# Patient Record
Sex: MALE | Race: WHITE | NOT HISPANIC OR LATINO
[De-identification: names, ages, dates, MRNs, and addresses within clinical notes are randomized per-mention and may not be internally consistent; named-entity substitution may affect disease eponyms.]

---

## 2017-06-08 ENCOUNTER — RECORDS - HEALTHEAST (OUTPATIENT)
Dept: ADMINISTRATIVE | Facility: OTHER | Age: 82
End: 2017-06-08

## 2017-06-26 ENCOUNTER — RECORDS - HEALTHEAST (OUTPATIENT)
Dept: ADMINISTRATIVE | Facility: OTHER | Age: 82
End: 2017-06-26

## 2017-06-29 ENCOUNTER — RECORDS - HEALTHEAST (OUTPATIENT)
Dept: ADMINISTRATIVE | Facility: OTHER | Age: 82
End: 2017-06-29

## 2017-06-30 ENCOUNTER — AMBULATORY - HEALTHEAST (OUTPATIENT)
Dept: CARDIAC REHAB | Facility: CLINIC | Age: 82
End: 2017-06-30

## 2017-06-30 DIAGNOSIS — Z98.890 S/P MVR (MITRAL VALVE REPAIR): ICD-10-CM

## 2017-07-07 ENCOUNTER — AMBULATORY - HEALTHEAST (OUTPATIENT)
Dept: CARDIOLOGY | Facility: CLINIC | Age: 82
End: 2017-07-07

## 2017-07-10 ENCOUNTER — AMBULATORY - HEALTHEAST (OUTPATIENT)
Dept: CARDIAC REHAB | Facility: CLINIC | Age: 82
End: 2017-07-10

## 2017-07-10 ENCOUNTER — COMMUNICATION - HEALTHEAST (OUTPATIENT)
Dept: TELEHEALTH | Facility: CLINIC | Age: 82
End: 2017-07-10

## 2017-07-10 DIAGNOSIS — Z98.890 S/P MVR (MITRAL VALVE REPAIR): ICD-10-CM

## 2017-07-10 RX ORDER — ACETAMINOPHEN 325 MG/1
325 TABLET ORAL EVERY 6 HOURS PRN
Status: SHIPPED | COMMUNITY
Start: 2017-07-10 | End: 2022-05-03 | Stop reason: ALTCHOICE

## 2017-07-10 RX ORDER — LISINOPRIL 10 MG/1
10 TABLET ORAL DAILY
Status: SHIPPED | COMMUNITY
Start: 2017-07-10

## 2017-07-12 ENCOUNTER — AMBULATORY - HEALTHEAST (OUTPATIENT)
Dept: CARDIAC REHAB | Facility: CLINIC | Age: 82
End: 2017-07-12

## 2017-07-12 DIAGNOSIS — Z98.890 S/P MVR (MITRAL VALVE REPAIR): ICD-10-CM

## 2017-07-13 ENCOUNTER — AMBULATORY - HEALTHEAST (OUTPATIENT)
Dept: CARDIOLOGY | Facility: CLINIC | Age: 82
End: 2017-07-13

## 2017-07-14 ENCOUNTER — OFFICE VISIT - HEALTHEAST (OUTPATIENT)
Dept: CARDIOLOGY | Facility: CLINIC | Age: 82
End: 2017-07-14

## 2017-07-14 ENCOUNTER — AMBULATORY - HEALTHEAST (OUTPATIENT)
Dept: CARDIAC REHAB | Facility: CLINIC | Age: 82
End: 2017-07-14

## 2017-07-14 DIAGNOSIS — Z98.890 S/P MVR (MITRAL VALVE REPAIR): ICD-10-CM

## 2017-07-14 DIAGNOSIS — I48.92 ATRIAL FLUTTER WITH CONTROLLED RESPONSE (H): ICD-10-CM

## 2017-07-14 DIAGNOSIS — Z98.890 STATUS POST MITRAL VALVE REPAIR: ICD-10-CM

## 2017-07-14 LAB
ATRIAL RATE - MUSE: 71 BPM
DIASTOLIC BLOOD PRESSURE - MUSE: NORMAL MMHG
INTERPRETATION ECG - MUSE: NORMAL
P AXIS - MUSE: NORMAL DEGREES
PR INTERVAL - MUSE: 250 MS
QRS DURATION - MUSE: 148 MS
QT - MUSE: 430 MS
QTC - MUSE: 467 MS
R AXIS - MUSE: -5 DEGREES
SYSTOLIC BLOOD PRESSURE - MUSE: NORMAL MMHG
T AXIS - MUSE: 53 DEGREES
VENTRICULAR RATE- MUSE: 71 BPM

## 2017-07-14 ASSESSMENT — MIFFLIN-ST. JEOR: SCORE: 1368.14

## 2017-07-15 ENCOUNTER — COMMUNICATION - HEALTHEAST (OUTPATIENT)
Dept: CARDIOLOGY | Facility: CLINIC | Age: 82
End: 2017-07-15

## 2017-07-15 ENCOUNTER — COMMUNICATION - HEALTHEAST (OUTPATIENT)
Dept: SCHEDULING | Facility: CLINIC | Age: 82
End: 2017-07-15

## 2017-07-17 ENCOUNTER — AMBULATORY - HEALTHEAST (OUTPATIENT)
Dept: CARDIOLOGY | Facility: CLINIC | Age: 82
End: 2017-07-17

## 2017-07-17 ENCOUNTER — HOSPITAL ENCOUNTER (OUTPATIENT)
Dept: LAB | Age: 82
Setting detail: SPECIMEN
Discharge: HOME | End: 2017-07-17

## 2017-07-17 ENCOUNTER — OFFICE VISIT - HEALTHEAST (OUTPATIENT)
Dept: CARDIOLOGY | Facility: CLINIC | Age: 82
End: 2017-07-17

## 2017-07-17 DIAGNOSIS — I48.92 ATRIAL FIBRILLATION AND FLUTTER (H): ICD-10-CM

## 2017-07-17 DIAGNOSIS — I48.92 ATRIAL FLUTTER WITH CONTROLLED RESPONSE (H): ICD-10-CM

## 2017-07-17 DIAGNOSIS — Z79.01 ANTICOAGULATION GOAL OF INR 2 TO 3: ICD-10-CM

## 2017-07-17 DIAGNOSIS — Z98.890 STATUS POST MITRAL VALVE REPAIR: ICD-10-CM

## 2017-07-17 DIAGNOSIS — I48.91 ATRIAL FIBRILLATION AND FLUTTER (H): ICD-10-CM

## 2017-07-17 DIAGNOSIS — Z51.81 ANTICOAGULATION GOAL OF INR 2 TO 3: ICD-10-CM

## 2017-07-17 LAB
ATRIAL RATE - MUSE: 264 BPM
DIASTOLIC BLOOD PRESSURE - MUSE: NORMAL MMHG
INTERPRETATION ECG - MUSE: NORMAL
P AXIS - MUSE: -87 DEGREES
PR INTERVAL - MUSE: NORMAL MS
QRS DURATION - MUSE: 136 MS
QT - MUSE: 410 MS
QTC - MUSE: 559 MS
R AXIS - MUSE: -45 DEGREES
SYSTOLIC BLOOD PRESSURE - MUSE: NORMAL MMHG
T AXIS - MUSE: -50 DEGREES
VENTRICULAR RATE- MUSE: 112 BPM

## 2017-07-17 ASSESSMENT — MIFFLIN-ST. JEOR: SCORE: 1368.14

## 2017-07-18 ENCOUNTER — SURGERY - HEALTHEAST (OUTPATIENT)
Dept: CARDIOLOGY | Facility: CLINIC | Age: 82
End: 2017-07-18

## 2017-07-18 ENCOUNTER — AMBULATORY - HEALTHEAST (OUTPATIENT)
Dept: CARDIOLOGY | Facility: CLINIC | Age: 82
End: 2017-07-18

## 2017-07-18 ENCOUNTER — COMMUNICATION - HEALTHEAST (OUTPATIENT)
Dept: CARDIOLOGY | Facility: CLINIC | Age: 82
End: 2017-07-18

## 2017-07-19 ENCOUNTER — HOSPITAL ENCOUNTER (OUTPATIENT)
Dept: CARDIOLOGY | Facility: CLINIC | Age: 82
Discharge: HOME | End: 2017-07-19
Attending: INTERNAL MEDICINE | Admitting: INTERNAL MEDICINE

## 2017-07-19 ENCOUNTER — COMMUNICATION - HEALTHEAST (OUTPATIENT)
Dept: CARDIOLOGY | Facility: CLINIC | Age: 82
End: 2017-07-19

## 2017-07-19 DIAGNOSIS — I48.91 ATRIAL FIBRILLATION AND FLUTTER (H): ICD-10-CM

## 2017-07-19 DIAGNOSIS — I48.92 ATRIAL FIBRILLATION AND FLUTTER (H): ICD-10-CM

## 2017-07-19 LAB
ATRIAL RATE - MUSE: 53 BPM
DIASTOLIC BLOOD PRESSURE - MUSE: NORMAL MMHG
INTERPRETATION ECG - MUSE: NORMAL
P AXIS - MUSE: 79 DEGREES
PR INTERVAL - MUSE: 184 MS
QRS DURATION - MUSE: 148 MS
QT - MUSE: 534 MS
QTC - MUSE: 501 MS
R AXIS - MUSE: -37 DEGREES
SYSTOLIC BLOOD PRESSURE - MUSE: NORMAL MMHG
T AXIS - MUSE: 31 DEGREES
VENTRICULAR RATE- MUSE: 53 BPM

## 2017-07-19 ASSESSMENT — MIFFLIN-ST. JEOR: SCORE: 1394.79

## 2017-07-21 ENCOUNTER — ANESTHESIA - HEALTHEAST (OUTPATIENT)
Dept: CARDIOLOGY | Facility: CLINIC | Age: 82
End: 2017-07-21

## 2017-07-21 ENCOUNTER — AMBULATORY - HEALTHEAST (OUTPATIENT)
Dept: CARDIAC REHAB | Facility: CLINIC | Age: 82
End: 2017-07-21

## 2017-07-21 DIAGNOSIS — Z98.890 S/P MVR (MITRAL VALVE REPAIR): ICD-10-CM

## 2017-07-24 ENCOUNTER — RECORDS - HEALTHEAST (OUTPATIENT)
Dept: ADMINISTRATIVE | Facility: OTHER | Age: 82
End: 2017-07-24

## 2017-07-24 ENCOUNTER — AMBULATORY - HEALTHEAST (OUTPATIENT)
Dept: CARDIOLOGY | Facility: CLINIC | Age: 82
End: 2017-07-24

## 2017-07-25 ENCOUNTER — OFFICE VISIT - HEALTHEAST (OUTPATIENT)
Dept: CARDIOLOGY | Facility: CLINIC | Age: 82
End: 2017-07-25

## 2017-07-25 DIAGNOSIS — Z98.890 STATUS POST MITRAL VALVE REPAIR: ICD-10-CM

## 2017-07-25 DIAGNOSIS — I50.31 ACUTE DIASTOLIC CHF (CONGESTIVE HEART FAILURE), NYHA CLASS 2 (H): ICD-10-CM

## 2017-07-25 DIAGNOSIS — I48.92 PAROXYSMAL ATRIAL FLUTTER (H): ICD-10-CM

## 2017-07-25 ASSESSMENT — MIFFLIN-ST. JEOR: SCORE: 1361.68

## 2017-07-26 ENCOUNTER — AMBULATORY - HEALTHEAST (OUTPATIENT)
Dept: CARDIAC REHAB | Facility: CLINIC | Age: 82
End: 2017-07-26

## 2017-07-26 DIAGNOSIS — Z98.890 S/P MVR (MITRAL VALVE REPAIR): ICD-10-CM

## 2017-07-28 ENCOUNTER — AMBULATORY - HEALTHEAST (OUTPATIENT)
Dept: CARDIAC REHAB | Facility: CLINIC | Age: 82
End: 2017-07-28

## 2017-07-28 DIAGNOSIS — Z98.890 S/P MVR (MITRAL VALVE REPAIR): ICD-10-CM

## 2017-07-31 ENCOUNTER — AMBULATORY - HEALTHEAST (OUTPATIENT)
Dept: CARDIAC REHAB | Facility: CLINIC | Age: 82
End: 2017-07-31

## 2017-07-31 DIAGNOSIS — Z98.890 S/P MVR (MITRAL VALVE REPAIR): ICD-10-CM

## 2017-08-02 ENCOUNTER — AMBULATORY - HEALTHEAST (OUTPATIENT)
Dept: CARDIAC REHAB | Facility: CLINIC | Age: 82
End: 2017-08-02

## 2017-08-02 DIAGNOSIS — Z98.890 S/P MVR (MITRAL VALVE REPAIR): ICD-10-CM

## 2017-08-04 ENCOUNTER — AMBULATORY - HEALTHEAST (OUTPATIENT)
Dept: CARDIAC REHAB | Facility: CLINIC | Age: 82
End: 2017-08-04

## 2017-08-04 ENCOUNTER — OFFICE VISIT - HEALTHEAST (OUTPATIENT)
Dept: CARDIOLOGY | Facility: CLINIC | Age: 82
End: 2017-08-04

## 2017-08-04 DIAGNOSIS — Z98.890 S/P MVR (MITRAL VALVE REPAIR): ICD-10-CM

## 2017-08-04 DIAGNOSIS — I50.30 HEART FAILURE WITH PRESERVED EJECTION FRACTION (H): ICD-10-CM

## 2017-08-04 DIAGNOSIS — I50.31 ACUTE DIASTOLIC CHF (CONGESTIVE HEART FAILURE), NYHA CLASS 2 (H): ICD-10-CM

## 2017-08-04 ASSESSMENT — MIFFLIN-ST. JEOR: SCORE: 1338.09

## 2017-08-07 ENCOUNTER — AMBULATORY - HEALTHEAST (OUTPATIENT)
Dept: CARDIAC REHAB | Facility: CLINIC | Age: 82
End: 2017-08-07

## 2017-08-07 DIAGNOSIS — Z98.890 S/P MVR (MITRAL VALVE REPAIR): ICD-10-CM

## 2017-08-09 ENCOUNTER — AMBULATORY - HEALTHEAST (OUTPATIENT)
Dept: CARDIAC REHAB | Facility: CLINIC | Age: 82
End: 2017-08-09

## 2017-08-09 DIAGNOSIS — Z98.890 S/P MVR (MITRAL VALVE REPAIR): ICD-10-CM

## 2017-08-11 ENCOUNTER — AMBULATORY - HEALTHEAST (OUTPATIENT)
Dept: CARDIAC REHAB | Facility: CLINIC | Age: 82
End: 2017-08-11

## 2017-08-11 DIAGNOSIS — Z98.890 S/P MVR (MITRAL VALVE REPAIR): ICD-10-CM

## 2017-08-14 ENCOUNTER — AMBULATORY - HEALTHEAST (OUTPATIENT)
Dept: CARDIAC REHAB | Facility: CLINIC | Age: 82
End: 2017-08-14

## 2017-08-14 DIAGNOSIS — Z98.890 S/P MVR (MITRAL VALVE REPAIR): ICD-10-CM

## 2017-08-16 ENCOUNTER — AMBULATORY - HEALTHEAST (OUTPATIENT)
Dept: CARDIAC REHAB | Facility: CLINIC | Age: 82
End: 2017-08-16

## 2017-08-16 DIAGNOSIS — Z98.890 S/P MVR (MITRAL VALVE REPAIR): ICD-10-CM

## 2017-08-18 ENCOUNTER — AMBULATORY - HEALTHEAST (OUTPATIENT)
Dept: CARDIAC REHAB | Facility: CLINIC | Age: 82
End: 2017-08-18

## 2017-08-18 DIAGNOSIS — Z98.890 S/P MVR (MITRAL VALVE REPAIR): ICD-10-CM

## 2017-08-21 ENCOUNTER — AMBULATORY - HEALTHEAST (OUTPATIENT)
Dept: CARDIAC REHAB | Facility: CLINIC | Age: 82
End: 2017-08-21

## 2017-08-21 DIAGNOSIS — Z98.890 S/P MVR (MITRAL VALVE REPAIR): ICD-10-CM

## 2017-08-23 ENCOUNTER — RECORDS - HEALTHEAST (OUTPATIENT)
Dept: ADMINISTRATIVE | Facility: OTHER | Age: 82
End: 2017-08-23

## 2017-08-23 ENCOUNTER — AMBULATORY - HEALTHEAST (OUTPATIENT)
Dept: CARDIOLOGY | Facility: CLINIC | Age: 82
End: 2017-08-23

## 2017-08-23 ENCOUNTER — AMBULATORY - HEALTHEAST (OUTPATIENT)
Dept: CARDIAC REHAB | Facility: CLINIC | Age: 82
End: 2017-08-23

## 2017-08-23 DIAGNOSIS — Z98.890 S/P MVR (MITRAL VALVE REPAIR): ICD-10-CM

## 2017-08-25 ENCOUNTER — OFFICE VISIT - HEALTHEAST (OUTPATIENT)
Dept: CARDIOLOGY | Facility: CLINIC | Age: 82
End: 2017-08-25

## 2017-08-25 ENCOUNTER — AMBULATORY - HEALTHEAST (OUTPATIENT)
Dept: CARDIAC REHAB | Facility: CLINIC | Age: 82
End: 2017-08-25

## 2017-08-25 DIAGNOSIS — Z98.890 S/P MVR (MITRAL VALVE REPAIR): ICD-10-CM

## 2017-08-25 DIAGNOSIS — I50.30 HEART FAILURE WITH PRESERVED EJECTION FRACTION (H): ICD-10-CM

## 2017-08-25 ASSESSMENT — MIFFLIN-ST. JEOR: SCORE: 1344.9

## 2017-08-28 ENCOUNTER — AMBULATORY - HEALTHEAST (OUTPATIENT)
Dept: CARDIAC REHAB | Facility: CLINIC | Age: 82
End: 2017-08-28

## 2017-08-28 DIAGNOSIS — Z98.890 S/P MVR (MITRAL VALVE REPAIR): ICD-10-CM

## 2017-08-30 ENCOUNTER — AMBULATORY - HEALTHEAST (OUTPATIENT)
Dept: CARDIAC REHAB | Facility: CLINIC | Age: 82
End: 2017-08-30

## 2017-08-30 DIAGNOSIS — Z98.890 S/P MVR (MITRAL VALVE REPAIR): ICD-10-CM

## 2017-09-01 ENCOUNTER — OFFICE VISIT - HEALTHEAST (OUTPATIENT)
Dept: CARDIOLOGY | Facility: CLINIC | Age: 82
End: 2017-09-01

## 2017-09-01 ENCOUNTER — AMBULATORY - HEALTHEAST (OUTPATIENT)
Dept: CARDIAC REHAB | Facility: CLINIC | Age: 82
End: 2017-09-01

## 2017-09-01 DIAGNOSIS — Z98.890 S/P MVR (MITRAL VALVE REPAIR): ICD-10-CM

## 2017-09-01 DIAGNOSIS — Z98.890 STATUS POST MITRAL VALVE REPAIR: ICD-10-CM

## 2017-09-01 DIAGNOSIS — I48.3 TYPICAL ATRIAL FLUTTER (H): ICD-10-CM

## 2017-09-01 DIAGNOSIS — I50.30 HEART FAILURE WITH PRESERVED EJECTION FRACTION (H): ICD-10-CM

## 2017-09-01 ASSESSMENT — MIFFLIN-ST. JEOR: SCORE: 1347.17

## 2017-09-06 ENCOUNTER — AMBULATORY - HEALTHEAST (OUTPATIENT)
Dept: CARDIAC REHAB | Facility: CLINIC | Age: 82
End: 2017-09-06

## 2017-09-06 DIAGNOSIS — Z98.890 S/P MVR (MITRAL VALVE REPAIR): ICD-10-CM

## 2017-09-08 ENCOUNTER — AMBULATORY - HEALTHEAST (OUTPATIENT)
Dept: CARDIAC REHAB | Facility: CLINIC | Age: 82
End: 2017-09-08

## 2017-09-08 DIAGNOSIS — Z98.890 S/P MVR (MITRAL VALVE REPAIR): ICD-10-CM

## 2017-09-11 ENCOUNTER — AMBULATORY - HEALTHEAST (OUTPATIENT)
Dept: CARDIAC REHAB | Facility: CLINIC | Age: 82
End: 2017-09-11

## 2017-09-11 DIAGNOSIS — Z98.890 S/P MVR (MITRAL VALVE REPAIR): ICD-10-CM

## 2017-10-17 ENCOUNTER — AMBULATORY - HEALTHEAST (OUTPATIENT)
Dept: CARDIAC REHAB | Facility: CLINIC | Age: 82
End: 2017-10-17

## 2017-10-17 DIAGNOSIS — I25.10 CORONARY ATHEROSCLEROSIS OF NATIVE CORONARY ARTERY: ICD-10-CM

## 2017-10-23 ENCOUNTER — AMBULATORY - HEALTHEAST (OUTPATIENT)
Dept: CARDIAC REHAB | Facility: CLINIC | Age: 82
End: 2017-10-23

## 2017-10-23 DIAGNOSIS — Z98.890 S/P MVR (MITRAL VALVE REPAIR): ICD-10-CM

## 2017-10-23 DIAGNOSIS — I25.10 CORONARY ATHEROSCLEROSIS OF NATIVE CORONARY ARTERY: ICD-10-CM

## 2017-10-31 ENCOUNTER — AMBULATORY - HEALTHEAST (OUTPATIENT)
Dept: CARDIOLOGY | Facility: CLINIC | Age: 82
End: 2017-10-31

## 2017-10-31 ENCOUNTER — RECORDS - HEALTHEAST (OUTPATIENT)
Dept: ADMINISTRATIVE | Facility: OTHER | Age: 82
End: 2017-10-31

## 2017-10-31 ENCOUNTER — OFFICE VISIT - HEALTHEAST (OUTPATIENT)
Dept: CARDIOLOGY | Facility: CLINIC | Age: 82
End: 2017-10-31

## 2017-10-31 DIAGNOSIS — Z98.890 STATUS POST MITRAL VALVE REPAIR: ICD-10-CM

## 2017-10-31 DIAGNOSIS — I48.3 TYPICAL ATRIAL FLUTTER (H): ICD-10-CM

## 2017-10-31 DIAGNOSIS — R01.1 HEART MURMUR: ICD-10-CM

## 2017-10-31 DIAGNOSIS — I50.30 HEART FAILURE WITH PRESERVED EJECTION FRACTION (H): ICD-10-CM

## 2017-10-31 DIAGNOSIS — Z51.81 ANTICOAGULATION GOAL OF INR 2 TO 3: ICD-10-CM

## 2017-10-31 DIAGNOSIS — Z79.01 ANTICOAGULATION GOAL OF INR 2 TO 3: ICD-10-CM

## 2017-10-31 ASSESSMENT — MIFFLIN-ST. JEOR: SCORE: 1326.75

## 2017-11-06 ENCOUNTER — HOSPITAL ENCOUNTER (OUTPATIENT)
Dept: CARDIOLOGY | Facility: CLINIC | Age: 82
Discharge: HOME | End: 2017-11-06
Attending: INTERNAL MEDICINE

## 2017-11-06 LAB
AORTIC ROOT: 3.4 CM
AR DECEL SLOPE: 2130 MM/S2
AR PEAK VELOCITY: 484 CM/S
AV REGURGITANT PEAK GRADIENT: 93.7 MMHG
AV REGURGITATION PRESSURE HALF TIME: 670 MS
BSA FOR ECHO PROCEDURE: 1.75 M2
CV BLOOD PRESSURE: NORMAL MMHG
CV ECHO HEIGHT: 71 IN
CV ECHO WEIGHT: 135 LBS
DOP CALC LVOT AREA: 4.52 CM2
DOP CALC LVOT DIAMETER: 2.4 CM
DOP CALC LVOT PEAK VEL: 85 CM/S
DOP CALC LVOT STROKE VOLUME: 99.5 CM3
DOP CALC MV VTI: 45.9 CM
DOP CALCLVOT PEAK VEL VTI: 22 CM
EJECTION FRACTION: 59 % (ref 55–75)
FRACTIONAL SHORTENING: 24.5 % (ref 28–44)
INTERVENTRICULAR SEPTUM IN END DIASTOLE: 1.4 CM (ref 0.6–1)
IVS/PW RATIO: 1.1
LA AREA 1: 24.6 CM2
LA AREA 2: 24.3 CM2
LEFT ATRIUM LENGTH: 5.75 CM
LEFT ATRIUM SIZE: 3.8 CM
LEFT ATRIUM TO AORTIC ROOT RATIO: 1.12 NO UNITS
LEFT ATRIUM VOLUME INDEX: 50.5 ML/M2
LEFT ATRIUM VOLUME: 88.4 CM3
LEFT VENTRICLE CARDIAC INDEX: 2.6 L/MIN/M2
LEFT VENTRICLE CARDIAC OUTPUT: 4.5 L/MIN
LEFT VENTRICLE DIASTOLIC VOLUME INDEX: 78.9 CM3/M2 (ref 34–74)
LEFT VENTRICLE DIASTOLIC VOLUME: 138 CM3 (ref 62–150)
LEFT VENTRICLE HEART RATE: 45 BPM
LEFT VENTRICLE MASS INDEX: 153.1 G/M2
LEFT VENTRICLE SYSTOLIC VOLUME INDEX: 32.6 CM3/M2 (ref 11–31)
LEFT VENTRICLE SYSTOLIC VOLUME: 57 CM3 (ref 21–61)
LEFT VENTRICULAR INTERNAL DIMENSION IN DIASTOLE: 4.9 CM (ref 4.2–5.8)
LEFT VENTRICULAR INTERNAL DIMENSION IN SYSTOLE: 3.7 CM (ref 2.5–4)
LEFT VENTRICULAR MASS: 267.9 G
LEFT VENTRICULAR OUTFLOW TRACT MEAN GRADIENT: 2 MMHG
LEFT VENTRICULAR OUTFLOW TRACT MEAN VELOCITY: 59.6 CM/S
LEFT VENTRICULAR OUTFLOW TRACT PEAK GRADIENT: 3 MMHG
LEFT VENTRICULAR POSTERIOR WALL IN END DIASTOLE: 1.3 CM (ref 0.6–1)
LV STROKE VOLUME INDEX: 56.8 ML/M2
MITRAL REGURGITANT VELOCITY TIME INTEGRAL: 218 CM
MITRAL VALVE DECELERATION SLOPE: 3100 MM/S2
MITRAL VALVE E/A RATIO: 1.2
MITRAL VALVE MEAN INFLOW VELOCITY: 54.3 CM/S
MITRAL VALVE PEAK VELOCITY: 98.9 CM/S
MITRAL VALVE PRESSURE HALF-TIME: 100 MS
MR FLOW: 28 CM3
MR MEAN GRADIENT: 66 MMHG
MR MEAN VELOCITY: 378 CM/S
MR PEAK GRADIENT: 114.1 MMHG
MR PISA EROA: 0.1 CM2
MR PISA RADIUS: 0.6 CM
MR PISA VN NYQUIST: 30.8 CM/S
MV AREA VTI: 2.17 CM2
MV AVERAGE E/E' RATIO: 11.7 CM/S
MV DECELERATION TIME: 141 MS
MV E'TISSUE VEL-LAT: 9.07 CM/S
MV E'TISSUE VEL-MED: 4.29 CM/S
MV LATERAL E/E' RATIO: 8.6
MV MEAN GRADIENT: 2 MMHG
MV MEDIAL E/E' RATIO: 18.2
MV PEAK A VELOCITY: 64.2 CM/S
MV PEAK E VELOCITY: 78 CM/S
MV PEAK GRADIENT: 3.9 MMHG
MV REGURGITANT VOLUME: 28.4 CC
MV VALVE AREA BY CONTINUITY EQUATION: 2.2 CM2
MV VALVE AREA PRESSURE 1/2 METHOD: 2.2 CM2
NUC REST DIASTOLIC VOLUME INDEX: 2160 LBS
NUC REST SYSTOLIC VOLUME INDEX: 71 IN
PISA MR PEAK VEL: 534 CM/S
RIGHT VENTRICULAR INTERNAL DIMENSION IN DYSTOLE: 2.9 CM
TRICUSPID REGURGITATION PEAK PRESSURE GRADIENT: 27 MMHG
TRICUSPID VALVE ANULAR PLANE SYSTOLIC EXCURSION: 1.7 CM
TRICUSPID VALVE PEAK REGURGITANT VELOCITY: 260 CM/S

## 2017-11-06 ASSESSMENT — MIFFLIN-ST. JEOR: SCORE: 1324.49

## 2017-11-07 ENCOUNTER — AMBULATORY - HEALTHEAST (OUTPATIENT)
Dept: CARDIOLOGY | Facility: CLINIC | Age: 82
End: 2017-11-07

## 2017-11-09 ENCOUNTER — COMMUNICATION - HEALTHEAST (OUTPATIENT)
Dept: CARDIOLOGY | Facility: CLINIC | Age: 82
End: 2017-11-09

## 2017-11-09 DIAGNOSIS — I48.3 TYPICAL ATRIAL FLUTTER (H): ICD-10-CM

## 2017-11-09 DIAGNOSIS — R01.1 HEART MURMUR: ICD-10-CM

## 2017-11-09 DIAGNOSIS — I50.9 HEART FAILURE (H): ICD-10-CM

## 2017-11-20 ENCOUNTER — HOSPITAL ENCOUNTER (OUTPATIENT)
Dept: CARDIOLOGY | Facility: CLINIC | Age: 82
Discharge: HOME | End: 2017-11-20
Attending: INTERNAL MEDICINE

## 2017-11-20 DIAGNOSIS — R01.1 HEART MURMUR: ICD-10-CM

## 2017-11-20 DIAGNOSIS — Z98.890 STATUS POST MITRAL VALVE REPAIR: ICD-10-CM

## 2017-11-20 DIAGNOSIS — Z79.01 ANTICOAGULATION GOAL OF INR 2 TO 3: ICD-10-CM

## 2017-11-20 DIAGNOSIS — I48.3 TYPICAL ATRIAL FLUTTER (H): ICD-10-CM

## 2017-11-20 DIAGNOSIS — I50.30 HEART FAILURE WITH PRESERVED EJECTION FRACTION (H): ICD-10-CM

## 2017-11-20 DIAGNOSIS — Z51.81 ANTICOAGULATION GOAL OF INR 2 TO 3: ICD-10-CM

## 2017-11-22 ENCOUNTER — AMBULATORY - HEALTHEAST (OUTPATIENT)
Dept: CARDIOLOGY | Facility: CLINIC | Age: 82
End: 2017-11-22

## 2017-12-19 ENCOUNTER — COMMUNICATION - HEALTHEAST (OUTPATIENT)
Dept: ADMINISTRATIVE | Facility: CLINIC | Age: 82
End: 2017-12-19

## 2018-02-26 ENCOUNTER — AMBULATORY - HEALTHEAST (OUTPATIENT)
Dept: CARDIOLOGY | Facility: CLINIC | Age: 83
End: 2018-02-26

## 2018-02-26 ENCOUNTER — RECORDS - HEALTHEAST (OUTPATIENT)
Dept: ADMINISTRATIVE | Facility: OTHER | Age: 83
End: 2018-02-26

## 2018-03-05 ENCOUNTER — OFFICE VISIT - HEALTHEAST (OUTPATIENT)
Dept: CARDIOLOGY | Facility: CLINIC | Age: 83
End: 2018-03-05

## 2018-03-05 DIAGNOSIS — Z51.81 ANTICOAGULATION GOAL OF INR 2 TO 3: ICD-10-CM

## 2018-03-05 DIAGNOSIS — Z98.890 STATUS POST MITRAL VALVE REPAIR: ICD-10-CM

## 2018-03-05 DIAGNOSIS — I48.3 TYPICAL ATRIAL FLUTTER (H): ICD-10-CM

## 2018-03-05 DIAGNOSIS — Z79.01 ANTICOAGULATION GOAL OF INR 2 TO 3: ICD-10-CM

## 2018-03-05 ASSESSMENT — MIFFLIN-ST. JEOR: SCORE: 1347.17

## 2018-05-21 ENCOUNTER — RECORDS - HEALTHEAST (OUTPATIENT)
Dept: LAB | Facility: CLINIC | Age: 83
End: 2018-05-21

## 2018-05-21 LAB
ANION GAP SERPL CALCULATED.3IONS-SCNC: 8 MMOL/L (ref 5–18)
BUN SERPL-MCNC: 20 MG/DL (ref 8–28)
CALCIUM SERPL-MCNC: 9.7 MG/DL (ref 8.5–10.5)
CHLORIDE BLD-SCNC: 111 MMOL/L (ref 98–107)
CO2 SERPL-SCNC: 21 MMOL/L (ref 22–31)
CREAT SERPL-MCNC: 0.96 MG/DL (ref 0.7–1.3)
FERRITIN SERPL-MCNC: 20 NG/ML (ref 27–300)
FOLATE SERPL-MCNC: 13 NG/ML
GFR SERPL CREATININE-BSD FRML MDRD: >60 ML/MIN/1.73M2
GLUCOSE BLD-MCNC: 69 MG/DL (ref 70–125)
IRON SERPL-MCNC: 47 UG/DL (ref 42–175)
POTASSIUM BLD-SCNC: 4.7 MMOL/L (ref 3.5–5)
SODIUM SERPL-SCNC: 140 MMOL/L (ref 136–145)
VIT B12 SERPL-MCNC: 252 PG/ML (ref 213–816)

## 2018-05-22 LAB
BASOPHILS # BLD AUTO: 0.1 THOU/UL (ref 0–0.2)
BASOPHILS NFR BLD AUTO: 1 % (ref 0–2)
EOSINOPHIL # BLD AUTO: 0.1 THOU/UL (ref 0–0.4)
EOSINOPHIL NFR BLD AUTO: 1 % (ref 0–6)
ERYTHROCYTE [DISTWIDTH] IN BLOOD BY AUTOMATED COUNT: 15.6 % (ref 11–14.5)
HCT VFR BLD AUTO: 28.5 % (ref 40–54)
HGB BLD-MCNC: 8.3 G/DL (ref 14–18)
LYMPHOCYTES # BLD AUTO: 0.8 THOU/UL (ref 0.8–4.4)
LYMPHOCYTES NFR BLD AUTO: 18 % (ref 20–40)
MCH RBC QN AUTO: 27.7 PG (ref 27–34)
MCHC RBC AUTO-ENTMCNC: 29.1 G/DL (ref 32–36)
MCV RBC AUTO: 95 FL (ref 80–100)
MONOCYTES # BLD AUTO: 0.6 THOU/UL (ref 0–0.9)
MONOCYTES NFR BLD AUTO: 13 % (ref 2–10)
NEUTROPHILS # BLD AUTO: 2.8 THOU/UL (ref 2–7.7)
NEUTROPHILS NFR BLD AUTO: 66 % (ref 50–70)
OVALOCYTES: ABNORMAL
PATH REPORT.MICROSCOPIC SPEC OTHER STN: ABNORMAL
PLAT MORPH BLD: NORMAL
PLATELET # BLD AUTO: 184 THOU/UL (ref 140–440)
PMV BLD AUTO: 12.5 FL (ref 8.5–12.5)
RBC # BLD AUTO: 3 MILL/UL (ref 4.4–6.2)
SCHISTOCYTES: ABNORMAL
WBC: 4.3 THOU/UL (ref 4–11)

## 2018-05-23 LAB
LAB AP CHARGES (HE HISTORICAL CONVERSION): NORMAL
PATH REPORT.COMMENTS IMP SPEC: NORMAL
PATH REPORT.COMMENTS IMP SPEC: NORMAL
PATH REPORT.FINAL DX SPEC: NORMAL
PATH REPORT.MICROSCOPIC SPEC OTHER STN: NORMAL
PATH REPORT.RELEVANT HX SPEC: NORMAL

## 2018-06-25 ENCOUNTER — RECORDS - HEALTHEAST (OUTPATIENT)
Dept: LAB | Facility: CLINIC | Age: 83
End: 2018-06-25

## 2018-06-25 LAB — FERRITIN SERPL-MCNC: 21 NG/ML (ref 27–300)

## 2018-06-26 LAB
BASOPHILS # BLD AUTO: 0.1 THOU/UL (ref 0–0.2)
BASOPHILS NFR BLD AUTO: 1 % (ref 0–2)
EOSINOPHIL # BLD AUTO: 0.1 THOU/UL (ref 0–0.4)
EOSINOPHIL NFR BLD AUTO: 1 % (ref 0–6)
ERYTHROCYTE [DISTWIDTH] IN BLOOD BY AUTOMATED COUNT: 17.2 % (ref 11–14.5)
HCT VFR BLD AUTO: 31.2 % (ref 40–54)
HGB BLD-MCNC: 9.9 G/DL (ref 14–18)
LAB AP CHARGES (HE HISTORICAL CONVERSION): NORMAL
LYMPHOCYTES # BLD AUTO: 0.8 THOU/UL (ref 0.8–4.4)
LYMPHOCYTES NFR BLD AUTO: 17 % (ref 20–40)
MCH RBC QN AUTO: 29 PG (ref 27–34)
MCHC RBC AUTO-ENTMCNC: 31.7 G/DL (ref 32–36)
MCV RBC AUTO: 92 FL (ref 80–100)
MONOCYTES # BLD AUTO: 0.5 THOU/UL (ref 0–0.9)
MONOCYTES NFR BLD AUTO: 11 % (ref 2–10)
NEUTROPHILS # BLD AUTO: 3.2 THOU/UL (ref 2–7.7)
NEUTROPHILS NFR BLD AUTO: 69 % (ref 50–70)
PATH REPORT.COMMENTS IMP SPEC: NORMAL
PATH REPORT.COMMENTS IMP SPEC: NORMAL
PATH REPORT.FINAL DX SPEC: NORMAL
PATH REPORT.MICROSCOPIC SPEC OTHER STN: ABNORMAL
PATH REPORT.MICROSCOPIC SPEC OTHER STN: NORMAL
PATH REPORT.RELEVANT HX SPEC: NORMAL
PLATELET # BLD AUTO: 151 THOU/UL (ref 140–440)
PMV BLD AUTO: 12.4 FL (ref 8.5–12.5)
RBC # BLD AUTO: 3.41 MILL/UL (ref 4.4–6.2)
WBC: 4.7 THOU/UL (ref 4–11)

## 2018-10-04 ENCOUNTER — RECORDS - HEALTHEAST (OUTPATIENT)
Dept: LAB | Facility: CLINIC | Age: 83
End: 2018-10-04

## 2018-10-04 LAB — FERRITIN SERPL-MCNC: 38 NG/ML (ref 27–300)

## 2018-10-25 ENCOUNTER — COMMUNICATION - HEALTHEAST (OUTPATIENT)
Dept: ADMINISTRATIVE | Facility: CLINIC | Age: 83
End: 2018-10-25

## 2018-10-29 ENCOUNTER — AMBULATORY - HEALTHEAST (OUTPATIENT)
Dept: CARDIAC REHAB | Facility: CLINIC | Age: 83
End: 2018-10-29

## 2018-10-29 DIAGNOSIS — Z98.890 S/P MVR (MITRAL VALVE REPAIR): ICD-10-CM

## 2018-11-02 ENCOUNTER — AMBULATORY - HEALTHEAST (OUTPATIENT)
Dept: CARDIAC REHAB | Facility: CLINIC | Age: 83
End: 2018-11-02

## 2018-11-02 DIAGNOSIS — Z98.890 S/P MVR (MITRAL VALVE REPAIR): ICD-10-CM

## 2018-11-28 ENCOUNTER — AMBULATORY - HEALTHEAST (OUTPATIENT)
Dept: CARDIAC REHAB | Facility: CLINIC | Age: 83
End: 2018-11-28

## 2018-11-28 DIAGNOSIS — Z98.890 S/P MVR (MITRAL VALVE REPAIR): ICD-10-CM

## 2019-01-08 ENCOUNTER — HOSPITAL ENCOUNTER (OUTPATIENT)
Dept: CARDIOLOGY | Facility: CLINIC | Age: 84
Discharge: HOME | End: 2019-01-08
Attending: INTERNAL MEDICINE

## 2019-01-08 DIAGNOSIS — Z98.890 STATUS POST MITRAL VALVE REPAIR: ICD-10-CM

## 2019-01-08 DIAGNOSIS — Z51.81 ANTICOAGULATION GOAL OF INR 2 TO 3: ICD-10-CM

## 2019-01-08 DIAGNOSIS — I48.3 TYPICAL ATRIAL FLUTTER (H): ICD-10-CM

## 2019-01-08 DIAGNOSIS — Z79.01 ANTICOAGULATION GOAL OF INR 2 TO 3: ICD-10-CM

## 2019-01-08 LAB
AORTIC ROOT: 4 CM
AORTIC VALVE MEAN VELOCITY: 80.2 CM/S
AR DECEL SLOPE: 2430 MM/S2
AR PEAK VELOCITY: 459 CM/S
ASCENDING AORTA: 3.7 CM
AV DIMENSIONLESS INDEX VTI: 0.8
AV MEAN GRADIENT: 3 MMHG
AV PEAK GRADIENT: 6.1 MMHG
AV REGURGITANT PEAK GRADIENT: 84.3 MMHG
AV REGURGITATION PRESSURE HALF TIME: 403 MS
AV VALVE AREA: 2.5 CM2
AV VELOCITY RATIO: 0.7
BSA FOR ECHO PROCEDURE: 1.78 M2
CV BLOOD PRESSURE: ABNORMAL MMHG
CV ECHO HEIGHT: 71 IN
CV ECHO WEIGHT: 140 LBS
DOP CALC AO PEAK VEL: 123 CM/S
DOP CALC AO VTI: 26.9 CM
DOP CALC LVOT AREA: 3.14 CM2
DOP CALC LVOT DIAMETER: 2 CM
DOP CALC LVOT PEAK VEL: 89.8 CM/S
DOP CALC LVOT STROKE VOLUME: 66.6 CM3
DOP CALC MV VTI: 35.3 CM
DOP CALCLVOT PEAK VEL VTI: 21.2 CM
EJECTION FRACTION: 58 % (ref 55–75)
FRACTIONAL SHORTENING: 18.8 % (ref 28–44)
INTERVENTRICULAR SEPTUM IN END DIASTOLE: 1.3 CM (ref 0.6–1)
IVS/PW RATIO: 1.3
LA AREA 1: 28.8 CM2
LA AREA 2: 30.4 CM2
LEFT ATRIUM LENGTH: 6.39 CM
LEFT ATRIUM SIZE: 3.8 CM
LEFT ATRIUM TO AORTIC ROOT RATIO: 0.95 NO UNITS
LEFT ATRIUM VOLUME INDEX: 65.4 ML/M2
LEFT ATRIUM VOLUME: 116.5 ML
LEFT VENTRICLE CARDIAC INDEX: 2.2 L/MIN/M2
LEFT VENTRICLE CARDIAC OUTPUT: 4 L/MIN
LEFT VENTRICLE DIASTOLIC VOLUME INDEX: 80.9 CM3/M2 (ref 34–74)
LEFT VENTRICLE DIASTOLIC VOLUME: 144 CM3 (ref 62–150)
LEFT VENTRICLE HEART RATE: 60 BPM
LEFT VENTRICLE MASS INDEX: 115.9 G/M2
LEFT VENTRICLE SYSTOLIC VOLUME INDEX: 34.3 CM3/M2 (ref 11–31)
LEFT VENTRICLE SYSTOLIC VOLUME: 61 CM3 (ref 21–61)
LEFT VENTRICULAR INTERNAL DIMENSION IN DIASTOLE: 4.8 CM (ref 4.2–5.8)
LEFT VENTRICULAR INTERNAL DIMENSION IN SYSTOLE: 3.9 CM (ref 2.5–4)
LEFT VENTRICULAR MASS: 206.4 G
LEFT VENTRICULAR OUTFLOW TRACT MEAN GRADIENT: 2 MMHG
LEFT VENTRICULAR OUTFLOW TRACT MEAN VELOCITY: 59.1 CM/S
LEFT VENTRICULAR OUTFLOW TRACT PEAK GRADIENT: 3 MMHG
LEFT VENTRICULAR POSTERIOR WALL IN END DIASTOLE: 1 CM (ref 0.6–1)
LV STROKE VOLUME INDEX: 37.4 ML/M2
MITRAL REGURGITANT VELOCITY TIME INTEGRAL: 228 CM
MITRAL VALVE DECELERATION SLOPE: 4950 MM/S2
MITRAL VALVE E/A RATIO: 1.9
MITRAL VALVE MEAN INFLOW VELOCITY: 61.9 CM/S
MITRAL VALVE PEAK VELOCITY: 116 CM/S
MITRAL VALVE PRESSURE HALF-TIME: 70 MS
MR FLOW: 36 CM3
MR MEAN GRADIENT: 75 MMHG
MR MEAN VELOCITY: 402 CM/S
MR PEAK GRADIENT: 123.2 MMHG
MR PISA EROA: 0.2 CM2
MR PISA RADIUS: 0.7 CM
MR PISA VN NYQUIST: 29.7 CM/S
MV AREA VTI: 1.89 CM2
MV AVERAGE E/E' RATIO: 10.6 CM/S
MV DECELERATION TIME: 193 MS
MV E'TISSUE VEL-LAT: 15.5 CM/S
MV E'TISSUE VEL-MED: 5.17 CM/S
MV LATERAL E/E' RATIO: 7.1
MV MEAN GRADIENT: 2 MMHG
MV MEDIAL E/E' RATIO: 21.3
MV PEAK A VELOCITY: 59.2 CM/S
MV PEAK E VELOCITY: 110 CM/S
MV PEAK GRADIENT: 5.4 MMHG
MV REGURGITANT VOLUME: 37.5 CC
MV VALVE AREA BY CONTINUITY EQUATION: 1.9 CM2
MV VALVE AREA PRESSURE 1/2 METHOD: 3.1 CM2
NUC REST DIASTOLIC VOLUME INDEX: 2240 LBS
NUC REST SYSTOLIC VOLUME INDEX: 71 IN
PISA MR PEAK VEL: 555 CM/S
PR MAX PG: 6 MMHG
PR PEAK VELOCITY: 127 CM/S
TRICUSPID VALVE ANULAR PLANE SYSTOLIC EXCURSION: 1.8 CM

## 2019-01-08 ASSESSMENT — MIFFLIN-ST. JEOR: SCORE: 1347.17

## 2019-01-16 ENCOUNTER — OFFICE VISIT - HEALTHEAST (OUTPATIENT)
Dept: CARDIOLOGY | Facility: CLINIC | Age: 84
End: 2019-01-16

## 2019-01-16 ENCOUNTER — RECORDS - HEALTHEAST (OUTPATIENT)
Dept: LAB | Facility: CLINIC | Age: 84
End: 2019-01-16

## 2019-01-16 DIAGNOSIS — I10 BENIGN ESSENTIAL HYPERTENSION: ICD-10-CM

## 2019-01-16 DIAGNOSIS — I50.30 HEART FAILURE WITH PRESERVED EJECTION FRACTION (H): ICD-10-CM

## 2019-01-16 DIAGNOSIS — Z98.890 STATUS POST MITRAL VALVE REPAIR: ICD-10-CM

## 2019-01-16 LAB — FERRITIN SERPL-MCNC: 42 NG/ML (ref 27–300)

## 2019-01-16 ASSESSMENT — MIFFLIN-ST. JEOR: SCORE: 1334.21

## 2019-01-28 ENCOUNTER — RECORDS - HEALTHEAST (OUTPATIENT)
Dept: ADMINISTRATIVE | Facility: OTHER | Age: 84
End: 2019-01-28

## 2019-02-04 ENCOUNTER — RECORDS - HEALTHEAST (OUTPATIENT)
Dept: ADMINISTRATIVE | Facility: OTHER | Age: 84
End: 2019-02-04

## 2019-03-05 ENCOUNTER — RECORDS - HEALTHEAST (OUTPATIENT)
Dept: ADMINISTRATIVE | Facility: OTHER | Age: 84
End: 2019-03-05

## 2019-03-22 ENCOUNTER — RECORDS - HEALTHEAST (OUTPATIENT)
Dept: ADMINISTRATIVE | Facility: OTHER | Age: 84
End: 2019-03-22

## 2019-04-05 ENCOUNTER — RECORDS - HEALTHEAST (OUTPATIENT)
Dept: ADMINISTRATIVE | Facility: OTHER | Age: 84
End: 2019-04-05

## 2019-05-31 ENCOUNTER — RECORDS - HEALTHEAST (OUTPATIENT)
Dept: ADMINISTRATIVE | Facility: OTHER | Age: 84
End: 2019-05-31

## 2019-06-28 ENCOUNTER — RECORDS - HEALTHEAST (OUTPATIENT)
Dept: ADMINISTRATIVE | Facility: OTHER | Age: 84
End: 2019-06-28

## 2019-08-15 ENCOUNTER — RECORDS - HEALTHEAST (OUTPATIENT)
Dept: ADMINISTRATIVE | Facility: OTHER | Age: 84
End: 2019-08-15

## 2020-01-06 ENCOUNTER — HOSPITAL ENCOUNTER (OUTPATIENT)
Dept: CARDIOLOGY | Facility: CLINIC | Age: 85
Discharge: HOME | End: 2020-01-06
Attending: INTERNAL MEDICINE

## 2020-01-06 DIAGNOSIS — I50.30 HEART FAILURE WITH PRESERVED EJECTION FRACTION (H): ICD-10-CM

## 2020-01-06 DIAGNOSIS — Z98.890 STATUS POST MITRAL VALVE REPAIR: ICD-10-CM

## 2020-01-06 DIAGNOSIS — I10 BENIGN ESSENTIAL HYPERTENSION: ICD-10-CM

## 2020-01-06 LAB
AORTIC ROOT: 4.2 CM
AORTIC VALVE MEAN VELOCITY: 78.6 CM/S
AR DECEL SLOPE: 2690 MM/S2
AR PEAK VELOCITY: 442 CM/S
ASCENDING AORTA: 3.8 CM
AV DIMENSIONLESS INDEX VTI: 0.7
AV MEAN GRADIENT: 3 MMHG
AV PEAK GRADIENT: 6.4 MMHG
AV REGURGITANT PEAK GRADIENT: 78.1 MMHG
AV REGURGITATION PRESSURE HALF TIME: 482 MS
AV VALVE AREA: 3.6 CM2
AV VELOCITY RATIO: 0.8
BSA FOR ECHO PROCEDURE: 1.76 M2
CV BLOOD PRESSURE: ABNORMAL MMHG
CV ECHO HEIGHT: 70.8 IN
CV ECHO WEIGHT: 137 LBS
DOP CALC AO PEAK VEL: 126 CM/S
DOP CALC AO VTI: 27.9 CM
DOP CALC LVOT AREA: 4.91 CM2
DOP CALC LVOT DIAMETER: 2.5 CM
DOP CALC LVOT PEAK VEL: 95.5 CM/S
DOP CALC LVOT STROKE VOLUME: 99.6 CM3
DOP CALC MV VTI: 37.8 CM
DOP CALCLVOT PEAK VEL VTI: 20.3 CM
EJECTION FRACTION: 65 % (ref 55–75)
FRACTIONAL SHORTENING: 32.5 % (ref 28–44)
INTERVENTRICULAR SEPTUM IN END DIASTOLE: 1.4 CM (ref 0.6–1)
IVS/PW RATIO: 1.2
LA AREA 1: 28.1 CM2
LA AREA 2: 31 CM2
LEFT ATRIUM LENGTH: 6.57 CM
LEFT ATRIUM SIZE: 4 CM
LEFT ATRIUM TO AORTIC ROOT RATIO: 0.95 NO UNITS
LEFT ATRIUM VOLUME INDEX: 64 ML/M2
LEFT ATRIUM VOLUME: 112.7 ML
LEFT VENTRICLE CARDIAC INDEX: 3.6 L/MIN/M2
LEFT VENTRICLE CARDIAC OUTPUT: 6.3 L/MIN
LEFT VENTRICLE DIASTOLIC VOLUME INDEX: 94.3 CM3/M2 (ref 34–74)
LEFT VENTRICLE DIASTOLIC VOLUME: 166 CM3 (ref 62–150)
LEFT VENTRICLE HEART RATE: 63 BPM
LEFT VENTRICLE MASS INDEX: 106 G/M2
LEFT VENTRICLE SYSTOLIC VOLUME INDEX: 33 CM3/M2 (ref 11–31)
LEFT VENTRICLE SYSTOLIC VOLUME: 58 CM3 (ref 21–61)
LEFT VENTRICULAR INTERNAL DIMENSION IN DIASTOLE: 4 CM (ref 4.2–5.8)
LEFT VENTRICULAR INTERNAL DIMENSION IN SYSTOLE: 2.7 CM (ref 2.5–4)
LEFT VENTRICULAR MASS: 186.5 G
LEFT VENTRICULAR OUTFLOW TRACT MEAN GRADIENT: 2 MMHG
LEFT VENTRICULAR OUTFLOW TRACT MEAN VELOCITY: 57.1 CM/S
LEFT VENTRICULAR OUTFLOW TRACT PEAK GRADIENT: 4 MMHG
LEFT VENTRICULAR POSTERIOR WALL IN END DIASTOLE: 1.2 CM (ref 0.6–1)
LV STROKE VOLUME INDEX: 56.6 ML/M2
MITRAL VALVE E/A RATIO: 1.8
MITRAL VALVE MEAN INFLOW VELOCITY: 67.5 CM/S
MITRAL VALVE PEAK VELOCITY: 134 CM/S
MV AREA VTI: 2.63 CM2
MV AVERAGE E/E' RATIO: 12.8 CM/S
MV DECELERATION TIME: 225 MS
MV E'TISSUE VEL-LAT: 13.9 CM/S
MV E'TISSUE VEL-MED: 4.09 CM/S
MV LATERAL E/E' RATIO: 8.3
MV MEAN GRADIENT: 2 MMHG
MV MEDIAL E/E' RATIO: 28.1
MV PEAK A VELOCITY: 65.6 CM/S
MV PEAK E VELOCITY: 115 CM/S
MV PEAK GRADIENT: 7.2 MMHG
MV VALVE AREA BY CONTINUITY EQUATION: 2.6 CM2
NUC REST DIASTOLIC VOLUME INDEX: 2192 LBS
NUC REST SYSTOLIC VOLUME INDEX: 70.75 IN
PR MAX PG: 13 MMHG
PR PEAK VELOCITY: 179 CM/S
TRICUSPID REGURGITATION PEAK PRESSURE GRADIENT: 32.9 MMHG
TRICUSPID VALVE ANULAR PLANE SYSTOLIC EXCURSION: 2.4 CM
TRICUSPID VALVE PEAK REGURGITANT VELOCITY: 287 CM/S

## 2020-01-06 ASSESSMENT — MIFFLIN-ST. JEOR: SCORE: 1329.59

## 2020-01-08 ENCOUNTER — AMBULATORY - HEALTHEAST (OUTPATIENT)
Dept: CARDIOLOGY | Facility: CLINIC | Age: 85
End: 2020-01-08

## 2020-01-08 ENCOUNTER — RECORDS - HEALTHEAST (OUTPATIENT)
Dept: ADMINISTRATIVE | Facility: OTHER | Age: 85
End: 2020-01-08

## 2020-01-13 ENCOUNTER — OFFICE VISIT - HEALTHEAST (OUTPATIENT)
Dept: CARDIOLOGY | Facility: CLINIC | Age: 85
End: 2020-01-13

## 2020-01-13 DIAGNOSIS — I10 BENIGN ESSENTIAL HYPERTENSION: ICD-10-CM

## 2020-01-13 DIAGNOSIS — Z98.890 STATUS POST MITRAL VALVE REPAIR: ICD-10-CM

## 2020-01-13 ASSESSMENT — MIFFLIN-ST. JEOR: SCORE: 1325.05

## 2021-02-24 ENCOUNTER — AMBULATORY - HEALTHEAST (OUTPATIENT)
Dept: NURSING | Facility: CLINIC | Age: 86
End: 2021-02-24

## 2021-03-17 ENCOUNTER — AMBULATORY - HEALTHEAST (OUTPATIENT)
Dept: NURSING | Facility: CLINIC | Age: 86
End: 2021-03-17

## 2021-03-23 ENCOUNTER — AMBULATORY - HEALTHEAST (OUTPATIENT)
Dept: CARDIOLOGY | Facility: CLINIC | Age: 86
End: 2021-03-23

## 2021-03-23 DIAGNOSIS — I48.3 TYPICAL ATRIAL FLUTTER (H): ICD-10-CM

## 2021-03-23 DIAGNOSIS — I50.30 HEART FAILURE WITH PRESERVED EJECTION FRACTION (H): ICD-10-CM

## 2021-03-23 DIAGNOSIS — Z98.890 STATUS POST MITRAL VALVE REPAIR: ICD-10-CM

## 2021-04-14 ENCOUNTER — HOSPITAL ENCOUNTER (OUTPATIENT)
Dept: CARDIOLOGY | Facility: HOSPITAL | Age: 86
Discharge: HOME | End: 2021-04-14
Attending: INTERNAL MEDICINE

## 2021-04-14 DIAGNOSIS — I50.30 HEART FAILURE WITH PRESERVED EJECTION FRACTION (H): ICD-10-CM

## 2021-04-14 DIAGNOSIS — I48.3 TYPICAL ATRIAL FLUTTER (H): ICD-10-CM

## 2021-04-14 DIAGNOSIS — Z98.890 STATUS POST MITRAL VALVE REPAIR: ICD-10-CM

## 2021-04-14 LAB
AORTIC ROOT: 3.4 CM
AORTIC VALVE MEAN VELOCITY: 88.5 CM/S
AR DECEL SLOPE: 1440 MM/S2
AR PEAK VELOCITY: 405 CM/S
ASCENDING AORTA: 3.4 CM
AV DIMENSIONLESS INDEX VTI: 0.8
AV MEAN GRADIENT: 4 MMHG
AV PEAK GRADIENT: 6.8 MMHG
AV REGURGITANT PEAK GRADIENT: 65.6 MMHG
AV REGURGITATION PRESSURE HALF TIME: 867 MS
AV VALVE AREA: 3.4 CM2
AV VELOCITY RATIO: 0.8
BSA FOR ECHO PROCEDURE: 1.75 M2
CV BLOOD PRESSURE: ABNORMAL MMHG
CV ECHO HEIGHT: 70.8 IN
CV ECHO WEIGHT: 136 LBS
DOP CALC AO PEAK VEL: 130 CM/S
DOP CALC AO VTI: 28.6 CM
DOP CALC LVOT AREA: 4.52 CM2
DOP CALC LVOT DIAMETER: 2.4 CM
DOP CALC LVOT PEAK VEL: 101 CM/S
DOP CALC LVOT STROKE VOLUME: 97.7 CM3
DOP CALC MV VTI: 39.6 CM
DOP CALCLVOT PEAK VEL VTI: 21.6 CM
EJECTION FRACTION: 58 % (ref 55–75)
FRACTIONAL SHORTENING: 19.6 % (ref 28–44)
INTERVENTRICULAR SEPTUM IN END DIASTOLE: 0.8 CM (ref 0.6–1)
IVS/PW RATIO: 0.9
LA AREA 1: 26.9 CM2
LA AREA 2: 27.1 CM2
LEFT ATRIUM LENGTH: 6.23 CM
LEFT ATRIUM SIZE: 3.8 CM
LEFT ATRIUM VOLUME INDEX: 56.8 ML/M2
LEFT ATRIUM VOLUME: 99.5 ML
LEFT VENTRICLE CARDIAC INDEX: 2.9 L/MIN/M2
LEFT VENTRICLE CARDIAC OUTPUT: 5.1 L/MIN
LEFT VENTRICLE DIASTOLIC VOLUME INDEX: 84.6 CM3/M2 (ref 34–74)
LEFT VENTRICLE DIASTOLIC VOLUME: 148 CM3 (ref 62–150)
LEFT VENTRICLE HEART RATE: 52 BPM
LEFT VENTRICLE MASS INDEX: 86.8 G/M2
LEFT VENTRICLE SYSTOLIC VOLUME INDEX: 35.4 CM3/M2 (ref 11–31)
LEFT VENTRICLE SYSTOLIC VOLUME: 62 CM3 (ref 21–61)
LEFT VENTRICULAR INTERNAL DIMENSION IN DIASTOLE: 5.1 CM (ref 4.2–5.8)
LEFT VENTRICULAR INTERNAL DIMENSION IN SYSTOLE: 4.1 CM (ref 2.5–4)
LEFT VENTRICULAR MASS: 151.8 G
LEFT VENTRICULAR OUTFLOW TRACT MEAN GRADIENT: 2 MMHG
LEFT VENTRICULAR OUTFLOW TRACT MEAN VELOCITY: 63.5 CM/S
LEFT VENTRICULAR OUTFLOW TRACT PEAK GRADIENT: 4 MMHG
LEFT VENTRICULAR POSTERIOR WALL IN END DIASTOLE: 0.9 CM (ref 0.6–1)
LV STROKE VOLUME INDEX: 55.8 ML/M2
MITRAL REGURGITANT VELOCITY TIME INTEGRAL: 199 CM
MITRAL VALVE E/A RATIO: 1
MITRAL VALVE MEAN INFLOW VELOCITY: 67.5 CM/S
MITRAL VALVE PEAK VELOCITY: 114 CM/S
MR FLOW: 34 CM3
MR MEAN GRADIENT: 82 MMHG
MR MEAN VELOCITY: 428 CM/S
MR PEAK GRADIENT: 122.8 MMHG
MR PISA EROA: 0.2 CM2
MR PISA RADIUS: 0.7 CM
MR PISA VN NYQUIST: 30.8 CM/S
MV AREA VTI: 2.47 CM2
MV AVERAGE E/E' RATIO: 10.5 CM/S
MV DECELERATION TIME: 257 MS
MV E'TISSUE VEL-LAT: 10.6 CM/S
MV E'TISSUE VEL-MED: 7.02 CM/S
MV LATERAL E/E' RATIO: 8.8
MV MEAN GRADIENT: 2 MMHG
MV MEDIAL E/E' RATIO: 13.2
MV PEAK A VELOCITY: 89.3 CM/S
MV PEAK E VELOCITY: 92.8 CM/S
MV PEAK GRADIENT: 5.2 MMHG
MV REGURGITANT VOLUME: 34 CC
MV VALVE AREA BY CONTINUITY EQUATION: 2.5 CM2
NUC REST DIASTOLIC VOLUME INDEX: 2176 LBS
NUC REST SYSTOLIC VOLUME INDEX: 70.75 IN
PISA MR PEAK VEL: 554 CM/S
PR MAX PG: 7 MMHG
PR PEAK VELOCITY: 131 CM/S
TRICUSPID REGURGITATION PEAK PRESSURE GRADIENT: 33.4 MMHG
TRICUSPID VALVE ANULAR PLANE SYSTOLIC EXCURSION: 2.1 CM
TRICUSPID VALVE PEAK REGURGITANT VELOCITY: 289 CM/S

## 2021-04-14 ASSESSMENT — MIFFLIN-ST. JEOR: SCORE: 1325.05

## 2021-04-15 ENCOUNTER — RECORDS - HEALTHEAST (OUTPATIENT)
Dept: ADMINISTRATIVE | Facility: OTHER | Age: 86
End: 2021-04-15

## 2021-04-19 ENCOUNTER — OFFICE VISIT - HEALTHEAST (OUTPATIENT)
Dept: CARDIOLOGY | Facility: CLINIC | Age: 86
End: 2021-04-19

## 2021-04-19 DIAGNOSIS — D50.9 IRON DEFICIENCY ANEMIA, UNSPECIFIED IRON DEFICIENCY ANEMIA TYPE: ICD-10-CM

## 2021-04-19 DIAGNOSIS — Z98.890 STATUS POST MITRAL VALVE REPAIR: ICD-10-CM

## 2021-04-19 DIAGNOSIS — I10 BENIGN ESSENTIAL HYPERTENSION: ICD-10-CM

## 2021-04-19 DIAGNOSIS — I48.3 TYPICAL ATRIAL FLUTTER (H): ICD-10-CM

## 2021-04-19 LAB
ALBUMIN SERPL-MCNC: 4 G/DL (ref 3.5–5)
ALP SERPL-CCNC: 69 U/L (ref 45–120)
ALT SERPL W P-5'-P-CCNC: 17 U/L (ref 0–45)
ANION GAP SERPL CALCULATED.3IONS-SCNC: 7 MMOL/L (ref 5–18)
AST SERPL W P-5'-P-CCNC: 34 U/L (ref 0–40)
BILIRUB SERPL-MCNC: 0.7 MG/DL (ref 0–1)
BUN SERPL-MCNC: 22 MG/DL (ref 8–28)
CALCIUM SERPL-MCNC: 9.3 MG/DL (ref 8.5–10.5)
CHLORIDE BLD-SCNC: 105 MMOL/L (ref 98–107)
CO2 SERPL-SCNC: 24 MMOL/L (ref 22–31)
CREAT SERPL-MCNC: 0.93 MG/DL (ref 0.7–1.3)
ERYTHROCYTE [DISTWIDTH] IN BLOOD BY AUTOMATED COUNT: 14.5 % (ref 11–14.5)
GFR SERPL CREATININE-BSD FRML MDRD: >60 ML/MIN/1.73M2
GLUCOSE BLD-MCNC: 84 MG/DL (ref 70–125)
HCT VFR BLD AUTO: 32.6 % (ref 40–54)
HGB BLD-MCNC: 9.8 G/DL (ref 14–18)
MCH RBC QN AUTO: 29.4 PG (ref 27–34)
MCHC RBC AUTO-ENTMCNC: 30.1 G/DL (ref 32–36)
MCV RBC AUTO: 98 FL (ref 80–100)
PLATELET # BLD AUTO: 189 THOU/UL (ref 140–440)
PMV BLD AUTO: 11.2 FL (ref 8.5–12.5)
POTASSIUM BLD-SCNC: 4.2 MMOL/L (ref 3.5–5)
PROT SERPL-MCNC: 6.8 G/DL (ref 6–8)
RBC # BLD AUTO: 3.33 MILL/UL (ref 4.4–6.2)
SODIUM SERPL-SCNC: 136 MMOL/L (ref 136–145)
WBC: 5.4 THOU/UL (ref 4–11)

## 2021-04-19 RX ORDER — TRIAMCINOLONE ACETONIDE 1 MG/G
OINTMENT TOPICAL
Status: SHIPPED | COMMUNITY
Start: 2021-01-18

## 2021-04-19 ASSESSMENT — MIFFLIN-ST. JEOR: SCORE: 1285.1

## 2021-04-20 ENCOUNTER — AMBULATORY - HEALTHEAST (OUTPATIENT)
Dept: CARDIOLOGY | Facility: CLINIC | Age: 86
End: 2021-04-20

## 2021-05-28 NOTE — PROGRESS NOTES
Discharge Note    Diagnosis: Mitral Valve Repair    Functional Outcomes:  Patient has participated in Phase III Cardiac Rehab from 10/23/2017 to 11/30/2018.  Patient has progressed/maintained a 3-4 met level for a duration of 45-50 minutes.  Target heart Rate 70-90.  RPE 11-14.  Resting HR Range 53-62  BP Range 120//78  Exercise HR Range 60-90 BP Range 118//82     Asymptomatic/ Symptomatic: Asymptomatic    Comments/Concerns: Patient decided to exercise on his own at home.         Patient verbalized an understanding of:  Cardiac signs/symptoms and emergency plan, Home Exercise program and Basic exercise principles    Patient has chosen to discontinue Phase III Cardiac Rehabilitation.

## 2021-05-31 VITALS — WEIGHT: 143.2 LBS | BODY MASS INDEX: 20.05 KG/M2 | HEIGHT: 71 IN

## 2021-05-31 VITALS — BODY MASS INDEX: 19.53 KG/M2 | WEIGHT: 139.5 LBS | HEIGHT: 71 IN

## 2021-05-31 VITALS — BODY MASS INDEX: 19.23 KG/M2 | HEIGHT: 72 IN | WEIGHT: 142 LBS

## 2021-05-31 VITALS — HEIGHT: 71 IN | BODY MASS INDEX: 18.9 KG/M2 | WEIGHT: 135 LBS

## 2021-05-31 VITALS — WEIGHT: 140 LBS | HEIGHT: 71 IN | BODY MASS INDEX: 19.6 KG/M2

## 2021-05-31 VITALS — WEIGHT: 142 LBS | HEIGHT: 72 IN | BODY MASS INDEX: 19.23 KG/M2

## 2021-05-31 VITALS — WEIGHT: 138 LBS | HEIGHT: 71 IN | BODY MASS INDEX: 19.32 KG/M2

## 2021-05-31 VITALS — HEIGHT: 72 IN | WEIGHT: 147 LBS | BODY MASS INDEX: 19.91 KG/M2

## 2021-05-31 VITALS — WEIGHT: 135.5 LBS | BODY MASS INDEX: 18.97 KG/M2 | HEIGHT: 71 IN

## 2021-06-01 VITALS — WEIGHT: 140 LBS | HEIGHT: 71 IN | BODY MASS INDEX: 19.6 KG/M2

## 2021-06-02 VITALS — HEIGHT: 71 IN | BODY MASS INDEX: 19.21 KG/M2 | WEIGHT: 137.2 LBS

## 2021-06-02 VITALS — HEIGHT: 71 IN | WEIGHT: 140 LBS | BODY MASS INDEX: 19.6 KG/M2

## 2021-06-04 VITALS — HEIGHT: 71 IN | BODY MASS INDEX: 19.18 KG/M2 | WEIGHT: 137 LBS

## 2021-06-04 VITALS
HEIGHT: 71 IN | HEART RATE: 60 BPM | SYSTOLIC BLOOD PRESSURE: 130 MMHG | DIASTOLIC BLOOD PRESSURE: 84 MMHG | BODY MASS INDEX: 19.04 KG/M2 | WEIGHT: 136 LBS | RESPIRATION RATE: 16 BRPM

## 2021-06-05 VITALS — WEIGHT: 136 LBS | HEIGHT: 71 IN | BODY MASS INDEX: 19.04 KG/M2

## 2021-06-05 VITALS
HEIGHT: 71 IN | RESPIRATION RATE: 12 BRPM | SYSTOLIC BLOOD PRESSURE: 160 MMHG | WEIGHT: 127.2 LBS | BODY MASS INDEX: 17.81 KG/M2 | DIASTOLIC BLOOD PRESSURE: 80 MMHG | HEART RATE: 60 BPM

## 2021-06-05 NOTE — PROGRESS NOTES
[facility] Heart Care Note    Assessment:  Mitral valve repair for severe mitral regurgitation at [facility] June 22, 2017      Dr Velez 63 mm [employer] annulus       Plication of the medial scallop of the posterior leaflet  Echocardiogram November 6, 2017 showed mild to moderate mitral regurgitation; mild to moderate aortic insufficiency      Prominent  Apical  systolic l murmur   Postoperative atrial fibrillation converted with IV amiodarone       Postoperative typical atrial flutter   Postoperative SVT converted with IV metoprolol with some sinus bradycardia (ECG HR=47)  Right bundle branch block with left axis deviation  Preserved left ventricular function with ejection fraction equals 59% by echocardiogram June 26, 2017      Plan:    We reviewed your medication list  No longer take iron  No longer take warfarin  No longer take aspirin  Echocardiogram showed the mitral valve opens  adequately and had a moderate amount of leakage and was unchanged compared to January 8, 2019   overall heart function seems strong  Continue current medications  Return in 1 year-have echocardiogram done prior to that visit  He have recurrence of atrial fibrillation, irregular heart rhythm you should start blood thinners, warfarin      Subjective:    I had the opportunity to see.Alvarado Quan , who is a 84 y.o. male with a known history of mitral valve disease    Patient has done quite well over the past year  Really does not like taking medicines and is pretty much stopped all his medications but does take lisinopril because of hypertension  Was noted to be normochromic normocytic anemia with hemoglobin in the tens and was seen by the hematologist recommended for bone marrow biopsy which he refused  At one time was given some vitamin B products parenterally, was also on iron but is no longer seeing the hematologist takes no hematological medications  Echocardiogram January 6, 2020 showed moderate mitral regurgitation with no  mitral stenosis ejection fraction was maintained and there was no substantial difference compared to a year ago, January 2019    He has had occasions of palpitations none lasting more than a few minutes  I discussed the importance of managing atrial fibrillation if he has more trouble with atrial fibrillation is at high risk for thromboembolism should be on anticoagulation.  He is quite opposed to taking anticoagulation at this time not even aspirin is acceptable to him      Problem List:  Patient Active Problem List   Diagnosis     Status post mitral valve repair     Typical atrial flutter (H)     Anticoagulation goal of INR 2 to 3     Heart failure with preserved ejection fraction (H)     Benign essential hypertension     Medical History:  Past Medical History:   Diagnosis Date     Arrhythmia     Atrial flutter     Atrial fibrillation (H)      Heart murmur     Mitral valve prolapse     Hypertension      Mitral regurgitation     severe     Right bundle branch block      Surgical History:  Past Surgical History:   Procedure Laterality Date     MITRAL VALVE REPAIR  06/2017    Julissa at [place]-plicated suture of posterior leaflet with annuloplasty band     Social History:  Social History     Socioeconomic History     Marital status:      Spouse name: Not on file     Number of children: Not on file     Years of education: Not on file     Highest education level: Not on file   Occupational History     Not on file   Social Needs     Financial resource strain: Not on file     Food insecurity:     Worry: Not on file     Inability: Not on file     Transportation needs:     Medical: Not on file     Non-medical: Not on file   Tobacco Use     Smoking status: Former Smoker     Smokeless tobacco: Never Used   Substance and Sexual Activity     Alcohol use: Not on file     Comment: occ     Drug use: No     Sexual activity: Not on file   Lifestyle     Physical activity:     Days per week: Not on file     Minutes per session:  "Not on file     Stress: Not on file   Relationships     Social connections:     Talks on phone: Not on file     Gets together: Not on file     Attends L[employer]-day service: Not on file     Active member of club or organization: Not on file     Attends meetings of clubs or organizations: Not on file     Relationship status: Not on file     Intimate partner violence:     Fear of current or ex partner: Not on file     Emotionally abused: Not on file     Physically abused: Not on file     Forced sexual activity: Not on file   Other Topics Concern     Not on file   Social History Narrative     Not on file       Review of Systems:      General: WNL  Eyes: WNL  Ears/Nose/Throat: WNL  Lungs: WNL  Heart: WNL  Stomach: WNL  Bladder: Frequent Urination at Night  Muscle/Joints: WNL  Skin: WNL  Nervous System: WNL  Mental Health: WNL     Blood: WNL        Family History:  Family History   Problem Relation Age of Onset     Hypertension Father      Stroke Brother          Allergies:  No Known Allergies    Medications:  Current Outpatient Medications   Medication Sig Dispense Refill     acetaminophen (TYLENOL) 325 MG tablet Take 325 mg by mouth every 6 (six) hours as needed for pain.       lisinopril (PRINIVIL,ZESTRIL) 10 MG tablet Take 10 mg by mouth daily.       No current facility-administered medications for this visit.        Objective:   Vital signs:  /84 (Patient Site: Left Arm, Patient Position: Sitting, Cuff Size: Adult Regular)   Pulse 60   Resp 16   Ht 5' 10.75\" (1.797 m)   Wt 136 lb (61.7 kg)   BMI 19.10 kg/m    A loud apical systolic murmur  No diastolic murmur  No neck vein distention or edema    Physical Exam:      GENERAL APPEARANCE: Alert, cooperative and in no acute distress.  HEENT: No scleral icterus. No Xanthelasma. Oral mucous membranes pink and moist.  NECK: JVP normal cm. No Hepatojugular reflux. Thyroid not  Palpable  CHEST: clear to auscultation and percussion  CARDIOVASCULAR: Very loud apical " murmur S   Brachial, radial  pulses are intact and symmetric.   No carotid bruits noted.  ABDOMEN: Non tender. BS+. No bruits.  EXTREMITIES: No cyanosis, clubbing or edema.    Lab Results:  LIPIDS:  No results found for: CHOL  No results found for: HDL  No results found for: LDLCALC  No results found for: TRIG  No components found for: CHOLHDL    BMP:  Lab Results   Component Value Date    CREATININE 0.96 05/21/2018    BUN 20 05/21/2018     05/21/2018    K 4.7 05/21/2018     (H) 05/21/2018    CO2 21 (L) 05/21/2018         This note has been dictated using voice recognition software. Any grammatical or context distortions are unintentional and inherent to the software.  Ben Gutierrez MD  [facility] HEART [facility]  [phone]

## 2021-06-05 NOTE — PATIENT INSTRUCTIONS - HE
Alvarado Quan    Thank you for coming to the [facility] today for a cardiac evaluation  It was my pleasure to see you today  A good contact for any questions would be Didi Reyes  RN @ [phone]    We reviewed your medication list  No longer take iron  No longer take warfarin  No longer take aspirin  Echocardiogram showed the mitral valve opens  adequately and had a moderate amount of leakage and was unchanged compared to January 8, 2019   overall heart function seems strong  Continue current medications  Return in 1 year-have echocardiogram done prior to that visit  He have recurrence of atrial fibrillation, irregular heart rhythm you should start blood thinners, warfarin

## 2021-06-11 NOTE — PROGRESS NOTES
Pt here for first time cardioversion.  Pt found to be in sinus marco antonio.  Dr. Casanova to see and discharge to home.

## 2021-06-11 NOTE — PROGRESS NOTES
CARDIOLOGY CLINIC CONSULT NOTE     Assessment/Plan:   1.  Atrial flutter, postoperatively from mitral valve repair.  Asymptomatic.  With severe left atrial enlargement noted on echo due to mitral insufficiency, high risk for future recurrences of atrial arrhythmias.  At this point I would favor long-term warfarin anticoagulation with a target INR 2.0-3.0.  We discussed a trial of cardioversion again in this early postoperative period and options of antiarrhythmic therapy including amiodarone or sotalol.  Given the likely long-term nature of need for arrhythmia suppression, we will try sotalol 40 mg twice daily.  We will discontinue his very low-dose metoprolol, although this does seem to be keeping his heart rate under reasonable control with activities.  We will plan direct-current cardioversion after 3 weeks of therapeutic INRs and I would like to see him in follow-up a month after that.  2.  Status post mitral valve repair with excellent surgical result.  Healing well with no evidence of sternal instability.  Encouraged to continue cardiac rehab  3.  History of hypertension well controlled on lisinopril and low-dose metoprolol.  No changes today  4.  Peripheral edema, likely related to atrial flutter.  Will monitor at this point as he reports it resolves overnight     History of Present Illness:     It is my pleasure to see Alvarado Quan at the [facility] clinic for evaluation of atrial flutter following mitral valve repair.    Alvarado Quan is a 82 y.o. male with a past medical history of hypertension and a heart murmur.  He presents today with his wife.  They note that he has sought medical care very infrequently in the past.  At some point he was diagnosed with mitral valve prolapse, and seen about 5 years ago at [facility].  Over the last couple of years he has had serial echoes performed, which are not available for my review.  There was apparently some worsening of his valvular  insufficiency and he had a consultation for valve repair.  He sought a second opinion at [facility] with Dr. Jair Velez.  He subsequently underwent mitral valve repair consisting of a plicated suture over the prolapsing middle segment posterior leaflet and an annuloplasty band.  No resection was performed.  His postoperative course it male was complicated by paroxysmal atrial flutter.  He was initiated on warfarin and amiodarone.  Amiodarone was discontinued at hospital discharge she was continued on low-dose metoprolol 12.5 twice daily along with lisinopril at discharge.    He started cardiac rehab earlier last week.  He was asymptomatic but atrial flutter was noted on the monitor.  He has noticed lower extremity edema but this resolves overnight per his report.  His weight is up just a pound since hospital discharge per their home scale    He reports loss of appetite since his discharge from the hospital.  His weight is up a pound.  He has noticed lower extremity edema that appears to resolve overnight.  He feels that his stamina is returning to normal.  He does note that prior to surgery he was getting tired after 30 push-ups but per male reports was able to walk 3 flights of steps comfortably in 2-1/2 miles on a flat surface prior to his surgery.    Past Medical History:     Patient Active Problem List   Diagnosis     Status post mitral valve repair     Atrial flutter with controlled response       Past Surgical History:     Past Surgical History:   Procedure Laterality Date     MITRAL VALVE REPAIR  06/2017    [facility]issa at [place]-plicated suture of posterior leaflet with annuloplasty band       Family History:     Family History   Problem Relation Age of Onset     Hypertension Father      Stroke Brother        Social History:    reports that he has never smoked. He does not have any smokeless tobacco history on file.    Exercise: Starting cardiac rehab    Sleep:      Meds:     Current Outpatient Prescriptions  "  Medication Sig Dispense Refill     acetaminophen (TYLENOL) 325 MG tablet Take 325 mg by mouth every 6 (six) hours as needed for pain.       aspirin 81 mg chewable tablet Chew 81 mg daily.       lisinopril (PRINIVIL,ZESTRIL) 10 MG tablet Take 10 mg by mouth daily.       warfarin (COUMADIN) 2 MG tablet Take 5 mg by mouth once daily. Take  to 2.5 tablets ( to 5 mg) by mouth daily. Adjust dose based on INR results as directed.       sotalol (BETAPACE) 80 MG tablet Take 0.5 tablets (40 mg total) by mouth 2 (two) times a day. 90 tablet 3     No current facility-administered medications for this visit.        Allergies:   Review of patient's allergies indicates no known allergies.    Review of Systems:     General: WNL  Eyes: WNL  Ears/Nose/Throat: WNL  Lungs: WNL  Heart: WNL  Stomach: WNL  Bladder: WNL  Muscle/Joints: WNL  Skin: WNL  Nervous System: WNL  Mental Health: WNL     Blood: WNL        Objective:      Physical Exam  142 lb (64.4 kg)  5' 11.75\" (1.822 m)  Body mass index is 19.39 kg/(m^2).  /78 (Patient Site: Left Arm, Patient Position: Sitting, Cuff Size: Adult Regular)  Pulse 64  Resp 16  Ht 5' 11.75\" (1.822 m)  Wt 142 lb (64.4 kg)  BMI 19.39 kg/m2      General Appearance : Awake, Alert, No acute distress  HEENT: No Scleral icterus; the mucous membranes were pink and moist.  Conjunctivae not injected  Neck:  No cervical bruits.  Jugular venous distention appreciated supraclavicularly bilaterally  Chest: The spine was straight.  Healing midline sternal incision without evidence of instability  Lungs: Respirations unlabored; the lungs reveal crackles at the left base only.  No wheezing   Cardiovascular:  Normal point of maximal impulse.  Auscultation reveals regularly irregular first and second heart sounds with 3/6short systolic murmur left parasternal border .  Carotid, radial, and dorsalis pedal pulses and intact.  Abdomen: No organomegaly, masses, bruits, or tenderness. Bowels sounds are " present  Extremities: 1-2+ edema to the midcalf bilaterally  Skin: No xanthelasma. Warm, Dry.  Musculoskeletal: No tenderness.  Neurologic:  No tenderness.      EKG:  Atrial flutter with with 4:1 conduction at 71 bpm.  Right bundle branch block.    Cardiac Imaging Studies:  Postop echo from [place] showing ejection fraction of 59%.    Lab Review     Braulio Valencia MD [facility]    His note created using Dragon voice recognition software. Sound alike errors may have escaped editing.

## 2021-06-11 NOTE — PROGRESS NOTES
[facility] HEART [facility]  Arrhythmia Clinic  Siva KAMARA Edilberto    Referring:      Assessment:         Typical atrial flutter: The patient was recently started on sotalol therapy for treatment of typical atrial flutter.  The patient recently underwent mitral valve surgery.  Patient was unaware of the change in rhythm status although he overall has not regained his strength or energy following his surgery at this point.  The patient was noted to be in sinus rhythm at presentation to the [facility] for scheduled cardioversion of his atrial flutter.  The patient's sinus rhythm was documented by twelve-lead EKG.  Right bundle branch block and first-degree AV block are noted.  The patient's QT interval is just over 500 ms corrected.  This is on a dose of 80 mg twice daily of sotalol.  I am recommending that the patient have his sotalol dose decreased to 60 mg twice daily.      Recommendations:    Change sotalol dose from 80 mg twice daily to 60 mg twice daily    Okay for discharge home    Follow-up as scheduled in the atrial fibrillation clinic with Elysia Fontana NP in 3 weeks as already scheduled.      Patient Active Problem List   Diagnosis     Status post mitral valve repair     Typical atrial flutter     Anticoagulation goal of INR 2 to 3       Subjective:  Alvarado Quan (82 y.o. male) presents to the [facility] for scheduled cardioversion of typical right atrial flutter.  The patient has no complaints on presentation.  When asked about any change in his rhythm status he was not aware of any.  He is doing cardiac rehab but has not noted any significant change in his current level of stamina or ability to tolerate exercise.  He reports no medication side effects.  He has had no presyncope or syncope.    Current Facility-Administered Medications   Medication Dose Route Frequency Provider Last Rate Last Dose     lidocaine 10 mg/mL (1 %) injection 0.1-0.3 mL  0.1-0.3 mL Subcutaneous PRN Ben Gutierrez MD         sodium  chloride 0.9 % flush 3 mL (NS)  3 mL Intravenous Line Care Ben Gutierrez MD         sodium chloride 0.9%  25 mL/hr Intravenous Continuous Ben Gutierrez MD         sotalol half-tablet 60 mg (BETAPACE)  60 mg Oral Q12H Siva Casanova MD         Current Outpatient Prescriptions   Medication Sig Dispense Refill     acetaminophen (TYLENOL) 325 MG tablet Take 325 mg by mouth every 6 (six) hours as needed for pain.       aspirin 81 mg chewable tablet Chew 81 mg daily.       lisinopril (PRINIVIL,ZESTRIL) 10 MG tablet Take 10 mg by mouth daily.       warfarin (COUMADIN) 2 MG tablet Take 4 mg by mouth daily. . Adjust dose based on INR results as directed.       sotalol (BETAPACE) 120 MG tablet Take 0.5 tablets (60 mg total) by mouth every 12 (twelve) hours. 30 tablet 3       Review of Systems:                                            Family History  Family History   Problem Relation Age of Onset     Hypertension Father      Stroke Brother        Social History   reports that he has never smoked. He has never used smokeless tobacco. He reports that he does not use illicit drugs.    Objective:   Vital signs in last 24 hours:  Pulse (!) 52  Temp 97.6  F (36.4  C) (Oral)   Resp 18  Ht 6' (1.829 m)  Wt 147 lb (66.7 kg)  SpO2 98%  BMI 19.94 kg/m2  Weight: @FLOWAMB(14)@     Physical Exam:  General: The patient is alert oriented to person place and situation.  The patient is in no acute distress at the time of my evaluation.  Eyes: Pupils are equal, round, and reactive to light.  Conjunctiva and sclera are clear.  ENT: Oral mucosa is moist and without redness. No evident nasal discharge.  Pulmonary: Lungs are notable for some mild decreased breath sounds in the left base but otherwise no rales, rhonchi, or wheezes.    Cardiovascular exam: Rhythm is regular. S1 and S2 are normal. No significant murmur is present. JVP is normal. Lower extremities demonstrate 1+ pretibial edema bilaterally.  Distal pulses are intact  bilaterally.  Abdomen is flat, soft, and nontender.  Musculoskeletal: Spine is straight. Extremities without deformity.  Neuro: Gait is not observed as the patient is at bedrest.     Skin is arm, dry, and otherwise intact.      Cardiographics:   Twelve-lead EKG demonstrates sinus rhythm with rate of 53 bpm.  KY interval is prolonged.  QRS duration is prolonged with typical right bundle branch block.  QT interval corrected is just over 500 ms.    Lab Results:   Lab Results   Component Value Date     07/17/2017    K 4.8 07/17/2017     07/17/2017    CO2 23 07/17/2017    BUN 17 07/17/2017    CREATININE 0.95 07/17/2017    CALCIUM 9.5 07/17/2017     No results found for: WBC, HGB, HCT, MCV, PLT  Lab Results   Component Value Date    INR 2.50 (!) 07/19/2017         Outside record review:

## 2021-06-11 NOTE — PROGRESS NOTES
ITP ASSESSMENT   Assessment Day: Initial  Session Number: 1  Precautions: sternal  Diagnosis: Mitral valve repair  Risk Stratification: Medium  Referring Provider: Beth Cheema MD  EXERCISE  Exercise Assessment: Initial     6 Minute Walk Test   Pre   Pre Exercise HR: 96                  Pre Exercise BP: 121/71    Peak  Peak HR: 137                 Peak BP: 144/78  Peak feet: 1200  Peak O2 SAT: 95  Peak RPE: 12  Peak MPH: 2.27    Symptoms:  Peak Symptoms: none    5 mins. Post  5 Min Post HR: 82  5 Min Post BP: 143/82                         Exercise Plan  Goals Next 30 days  ADL'S: Resume driving  Leisure: Resume projects at home including sorting books  Work: Resume light yard work      Education Goals: Patient can state cardiac s/s and appropriate emergency response.  Education Goals Met: Has system for taking medication.;Medication review.    Exercise Prescription  Exercise Mode: Treadmill;Bike;Nustep;Hallway Walking  Frequency: 3x week  Duration: 25-35 mins.  Intensity / THR: 20-30 beats above resting heart rate  RPE 11-14  Progression / Met level: 2.5-3.5 mets  Resistive Training?: No    Current Exercise (mins/week): 140    Interventions  Home Exercise:  Mode: walk  Frequency: daily  Duration: 20 mins    Education Material : Educational videos;Provide written material;Individual education and counseling;Offer educational classes    Education Completed  Exercise Education Completed: Medication review;RPE;Home Exercise            Exercise Follow-up/Discharge  Follow up/Discharge: pt. will continue to increase home exercise time and distance NUTRITION  Nutrition Assessment: Initial    Nutrition Risk Factors:  Nutrition Risk Factors: NA    Nutrition Plan  Interventions  Nutrition Interventions: Diet consult;Diet class;Therapist/Patient discussion;Educational videos;Provide with written material      Education Completed      Goals  Nutrition Goals (Next 30 days): Patient can identify their risk factors for  "CAD;Patient will maintain current weight or gradual weight gain;Patient will follow a low sodium diet;Review Dietitian schedule;Patient will follow a low saturated fat diet    Goals Met  Nutrition Goals Met: Provided Rate your Plate Survey;Completed Nutritional Risk Screen    Height, Weight, and  BMI  Weight: 138 lb 12.8 oz (63 kg)  Height: 5' 11\" (1.803 m)  BMI: 19.37    Nutrition Follow-up  Follow-up/Discharge: Pt. is underweight and could benefit from a nutrition consultation       Other Risk Factors  Other Risk Factor Assessment: Initial    HTN Risk Factor: Hypertension    Pre Exercise BP: 121/71  Post Exercise BP: 143/78    Hypertension Plan  Goals  HTN Goals: Patient demonstrates understanding of HTN, no goals identified for the next 30 days    Goals Met  HTN Goals Met: Exercises regularly;Take medication as prescribed    HTN Interventions  HTN Interventions: Offer educational classes;Offer educational videos;Provide written material;Therapist/patient discussion;Diet consult    HTN Education Completed  HTN Education Completed: Medication review    Tobacco Risk Factor: NA        Tobacco Plan  Tobacco Goals      Goals Met       PSYCHOSOCIAL  Psychosocial Assessment: Initial     [facility] L Summary Score: 22    ALISHA-D Score: 8    Psychosocial Risk Factor: NA    Psychosocial Plan  Interventions  If ALISHA-D > 15 send letter to MD      Education Completed      Goals  Goals (Next 30 days): Oriented to stress management classes;Identify stressors;Improvement in [employer] COOP score    Goals Met  Goals Met: Identified Support system;Practicing stress management skills    Psychosocial Follow-up             Patient involved in Goal setting?: Yes    Signature: _____________________________________________________________    Date: __________________    Time: __________________  "

## 2021-06-11 NOTE — PROGRESS NOTES
[facility] Heart Care Note    Assessment:  Mitral valve repair for severe mitral regurgitation at [facility] June 22, 2017      Dr Velez 63 mm [employer]tr[employer] band implant       Plication of the medial scallop of the posterior leaflet       Postoperative atrial fibrillation converted with IV amiodarone       Postoperative SVT converted with IV metoprolol with some sinus bradycardia (ECG HR=47)   Atrial flutter, postoperatively from mitral valve repair.   .   ECG shows a typical or isthmus dependent type flutter with elevated ventricular response  Right bundle branch block with left axis deviation  Preserved left ventricular function with ejection fraction equals 59% by echocardiogram June 26, 2017    Plan  Management of atrial flutter could include direct external  cardioversion or atrial flutter ablation  I recommend starting out with a cardioversion.  The guidelines would be satisfactory INR for at least 3 weeks  Then elective  external  cardioversion could be done  Increase sotalol to 80 mg twice daily for better rate control; take 2 of the 40 mg sotalol tablets twice daily  I am somewhat concerned about underlying sinus node dysfunction-this would be more revealed once cardioversion has been completed  [facility] noted some sinus bradycardia after metoprolol was used for treatment of SVT  We briefly discussed pacemaker: No indication for pacemaker at this time  Patient may be candidate for atrial flutter ablation.  His rhythm appears to be isthmus dependent flutter : but with substantial left atrial enlargement, he is also at risk for atrial fibrillation; so a standard flutter ablation may not necessarily be curative in the long-term  We will need to review INR levels and decide when we can proceed to cardioversion  Blood work today will include INR, BMP    ECG today shows atrial flutter with ventricular rate equals 112, right bundle branch block with left axis deviation: The QT interval does not appear prolonged  although the computer does call a long QT.  It is very difficult to assess QT with a flutter wave pattern    INR=2.9      Subjective:    I had the opportunity to see.Alvarado Quan , who is a 82 y.o. male with a known history of her mitral regurgitation  Because of severe mitral regurgitation he underwent mitral valve repair June 22, 2017 at [facility]  He presented with normal sinus rhythm  He had atrial fibrillation June 22 that was converted with IV amiodarone  He had SVT June 26 that was treated with IV metoprolol  He was noted to have sinus bradycardia June 26; heart rate was 47 on his EKG  He was dismissed on metoprolol 12.5 mg twice daily and warfarin  No prior history of anticoagulation  No history of strokes or TIAs  There is  no history of internal bleeding hemorrhage or need for blood transfusions  Postoperative echocardiogram showed ejection fraction 59%; tiny pericardial effusion  He apparently has no coronary artery disease, did not have bypass surgery  Reviewing the surgical report  of his mitral valve repair ; it looks like he did not have  a left atrial maze or left atrial appendage ideation/ occlusion        Problem List:  Patient Active Problem List   Diagnosis     Status post mitral valve repair     Atrial flutter with controlled response     Anticoagulation goal of INR 2 to 3     Medical History:  Past Medical History:   Diagnosis Date     Arrhythmia     Atrial flutter     Heart murmur     Mitral valve prolapse     Surgical History:  Past Surgical History:   Procedure Laterality Date     MITRAL VALVE REPAIR  06/2017    Julissa at [place]-plicated suture of posterior leaflet with annuloplasty band     Social History:  Social History     Social History     Marital status:      Spouse name: N/A     Number of children: N/A     Years of education: N/A     Occupational History     Not on file.     Social History Main Topics     Smoking status: Never Smoker     Smokeless tobacco: Not on file      "Alcohol use Not on file     Drug use: Not on file     Sexual activity: Not on file     Other Topics Concern     Not on file     Social History Narrative       Review of Systems:           General: WNL  Eyes: WNL  Ears/Nose/Throat: WNL  Lungs: WNL  Heart: WNL  Stomach: WNL  Bladder: WNL  Muscle/Joints: WNL  Skin: WNL  Nervous System: WNL  Mental Health: WNL     Blood: WNL            Family History   Problem Relation Age of Onset     Hypertension Father       Stroke                                       Family History:  Family History   Problem Relation Age of Onset     Hypertension Father      Stroke Brother          Allergies:  No Known Allergies    Medications:  Current Outpatient Prescriptions   Medication Sig Dispense Refill     acetaminophen (TYLENOL) 325 MG tablet Take 325 mg by mouth every 6 (six) hours as needed for pain.       aspirin 81 mg chewable tablet Chew 81 mg daily.       lisinopril (PRINIVIL,ZESTRIL) 10 MG tablet Take 10 mg by mouth daily.       sotalol (BETAPACE) 80 MG tablet Take 0.5 tablets (40 mg total) by mouth 2 (two) times a day. 90 tablet 3     warfarin (COUMADIN) 2 MG tablet Take 5 mg by mouth once daily. Take  to 2.5 tablets ( to 5 mg) by mouth daily. Adjust dose based on INR results as directed.       No current facility-administered medications for this visit.        Objective:   Vital signs:  /80 (Patient Site: Left Arm, Patient Position: Sitting, Cuff Size: Adult Regular)  Pulse (!) 104  Resp 18  Ht 5' 11.75\" (1.822 m)  Wt 142 lb (64.4 kg)  BMI 19.39 kg/m2    Rate about 110 and slightly irregular  Respiratory rate unlabored  Is alert appropriate articulate does not appear to be in any distress  Physical Exam:      GENERAL APPEARANCE: Alert, cooperative and in no acute distress.  HEENT: No scleral icterus. No Xanthelasma. Oral mucous membranes pink and moist.  NECK: JVP normal  cm. No Hepatojugular reflux. Thyroid not  Palpable  CHEST: clear to auscultation and " percussion  CARDIOVASCULAR: S1, S2 without murmur    Brachial, radial  pulses are intact and symmetric.   No carotid bruits noted.  ABDOMEN: Non tender. BS+. No bruits.  EXTREMITIES: No cyanosis, clubbing or edema.    Lab Results:  LIPIDS:  No results found for: CHOL  No results found for: HDL  No results found for: LDLCALC  No results found for: TRIG  No components found for: CHOLHDL    BMP:  No results found for: CREATININE, BUN, NA, K, CL, CO2      This note has been dictated using voice recognition software. Any grammatical or context distortions are unintentional and inherent to the software.  Ben Gutierrez MD  [facility]  [phone]

## 2021-06-11 NOTE — PROGRESS NOTES
Cardiac Rehab  Phase II Assessment    Assessment Date: 7/10/17  Diagnosis:mitral valve stenosis  Date of Onset:6/22/17   Procedure: Mitral valve repair Date of Onset: 6/22/17  ICD/Pacemaker: No Parameters:   Post-op Complications: A-fib, A-flutter  ECG History: SR, A-fib, A-flutter EF%:59  Past Medical History: Aortic valve regurgitation, mild left ventricular function    Physical Assessment  Precautions/ Physical Limitations:Sternal precautions  Oxygen: No  O2 Sats: 95% Lung Sounds: Edema: none  Incisions: healing well  Sleeping Pattern: fair   Appetite: good   Nutrition Risk Screen: Weight loss    Pain  Location: Chest, back  Characteristics:surgical pain: aching  Intensity: (0-10 scale) 2  Current Pain Management: Tylenol if needed  Intervention:   Response: relief    Psychosocial/ Emotional Health  1. In the past 12 months, have you been in a relationship where you have been abused physically, emotionally, sexually or financially? No  notified: NA  2. Who do you turn to for emotional support?: wife and family  3. Do you have cultural or spiritual needs? No  4. Have there been any major life changes in the past 12 months? Yes . Heart surgery    Referral Information  Primary Physician: Beth Cheema MD  Cardiologist: Dr. Braulio Valencia  [facility]    Home exercise/Equipment: none    Patient's long-term goal(s): Resume active lifestyle, Travel to [place] this year    1. Living Accommodations: Home Steps: Yes      Support people at home: yes   2. Marital Status:   3. Family is able to assist with cares      Sikhism/Community involvement:   4. Recreation/Hobbies: Family activities, home projects, reading

## 2021-06-12 NOTE — PROGRESS NOTES
Alvarado Pfeiffercolby has participated in 21 sessions of Phase II Cardiac Rehab.    Progress Report:   Cardiac Rehab Treatment Progress Report 8/23/2017 8/25/2017 8/28/2017 8/30/2017 9/1/2017   Weight 137 lbs 130 lbs 138 lbs 10 oz 139 lbs 139 lbs   Pre Exercise  HR 50 56 60 53 54   Pre Exercise /78 140/70 124/62 120/62 156/60   Treadmill Peak HR 77 84 84 96 92   Treadmill Peak Blood Pressure 150/80 154/84 156/74 148/86 134/70   Nustep Peak Heart Rate 95 - - 80 -   Nustep Peak Blood Pressure 144/78 - - 148/82 -   Heart Rate 60 61 - 59 78   Post Exercise /70 120/66 - 132/78 124/76   ECG SB SR SB-SR - SB-SR SB-SR   Total Exercise Minutes 40 50 40 47 45         Current Status:  Currently exercising without complaints or symptoms.      Therapists Comments: Pt is making steady progress towards his rehab exercise and cardiac educational goals. He should complete his rehab with us in another week. He wants to travel to [place] in the next month with MD approval to do so.    If Physician recommends change in treatment plan, please place orders.        __________________________________________________      _____________  Signature                                                                                                  Date

## 2021-06-12 NOTE — PROGRESS NOTES
ITP ASSESSMENT   Assessment Day: 60 Day  Session Number: 23  Precautions: sternal precautions  Diagnosis: Valve (repair of mitral valve)  Risk Stratification: Medium  Referring Provider: Beth Cheema MD  EXERCISE  Exercise Assessment: Reassessment     6 Minute Walk Test   Pre   Pre Exercise HR: 53 (02= 99 %)                  Pre Exercise BP: 130/76    Peak  Peak HR: 95                 Peak BP: 180/90  Peak feet: 1750  Peak O2 SAT: 100  Peak RPE: 13  Peak MPH: 3.31    Symptoms:  Peak Symptoms: none    5 mins. Post  5 Min Post HR: 73  5 Min Post BP: 140/82                         Exercise Plan  Goals Next 30 days    Leisure: Pt will establish a walking routine 4-5 x week for 30-60 minutes      Education Goals: Patient can state cardiac s/s and appropriate emergency response.  Education Goals Met: Has system for taking medication.;Medication review.                        Goals Met  30 day ADL'S goals met: Pt has returned to light home projects  following his sternal precautions  30 day Leisure goals met: Pt reports his return to light resistive hand wts and wall push ups are going well and denies sternal discomfort.  30 day Work goals met: Pt has resumed light raking and picking up trigs in the yard following his sternal precautions and without sxs.      Initial ADL's goals met: pt has begun driving without problem  Initial Leisure goals met: able to get books sorted without fatigue  Intial Work goals met: has not done any yardwork yet.  Initial Progression: Pt gradually able to climb 2 flights slowly without tiring, returning to light yardwork as able.    Exercise Prescription  Exercise Mode: Treadmill;Bike;Nustep;Hallway Walking;Arm Erg.;Elliptical  Frequency: 2-3 x week  Duration: 45-55 minutes  Intensity / THR: 20-30 beats above resting heart rate  RPE 11-14  Progression / Met level: 3.0- 5.0 met level  Resistive Training?: Yes (using hand wts)    Current Exercise (mins/week): 60    Interventions  Home  "Exercise:  Mode: walking  Frequency: 4 x week  Duration: 30-45 minutes    Education Material : Educational videos;Provide written material;Individual education and counseling;Offer educational classes    Education Completed  Exercise Education Completed: Medication review;RPE;Home Exercise;Signs and Symptoms;Emergency Plan;Warm up/cool down;Strength training;End point of exercise;Benefits of Exercise;FITT Principles            Exercise Follow-up/Discharge  Follow up/Discharge: Pt has one more session left and is close to meeting all his rehab goals. NUTRITION  Nutrition Assessment: Reassessment    Nutrition Risk Factors:  Nutrition Risk Factors: NA    Nutrition Plan  Interventions      Education Completed      Goals      Goals Met  Nutrition Goals Met: Provided Rate your Plate Survey;Completed Nutritional Risk Screen;Patient knows appropriate portion size;Patient will maintain current weight or gradual weight gain    Height, Weight, and  BMI  Weight: 138 lb 9.6 oz (62.9 kg)  Height: 5' 11\" (1.803 m)  BMI: 19.34    Nutrition Follow-up  Follow-up/Discharge: Pt now drinking high protein supplemental drinks between meals. (Cont to lose weight but trying to add high protein drinks.)       Other Risk Factors  Other Risk Factor Assessment: Reassessment    HTN Risk Factor: Hypertension    Pre Exercise BP: 130/76  Post Exercise BP: 122/70    Hypertension Plan  Goals  HTN Goals: Follow low sodium diet    Goals Met  HTN Goals Met: Exercises regularly;Take medication as prescribed    HTN Interventions  HTN Interventions: Offer educational classes;Offer educational videos;Provide written material;Therapist/patient discussion;Diet consult    HTN Education Completed  HTN Education Completed: Medication review    Tobacco Risk Factor: NA          T  Risk Factor Follow-up   Follow-up/Discharge: reports snacks on [place] chips each day.    PSYCHOSOCIAL  Psychosocial Assessment: Reassessment     [place] COOP Q of L Summary Score: " 15    ALISHA-D Score: 1    Psychosocial Risk Factor: NA    Psychosocial Plan  Interventions  Interventions: Offer educational videos and classes;Provide written material;Individual education and counseling    Education Completed  No Data Recorded    Goals  Goals (Next 30 days): [facility]    Goals Met  Goals Met: Identified Support system;Practicing stress management skills    Psychosocial Follow-up  Follow-up/Discharge: feels he's managing his stress thru more regular walks           Patient involved in Goal setting?: Yes. Pt has one Phase 2 Cardiac Rehab session left. He is close to resuming all his previous ADL's and is feeling able to continue an exercise routine on his own. He is looking forward to going home to [place] in the near future.    Signature: _____________________________________________________________    Date: __________________    Time: __________________

## 2021-06-12 NOTE — PROGRESS NOTES
[facility] Heart Care Office Note    Assessment / Plan:    1.  Paroxysmal atrial flutter, maintaining sinus rhythm on low-dose sotalol.  We discussed the likelihood that this was related to his surgical procedure and a trial of discontinuing antiarrhythmic therapy is reasonable.  They would prefer to wait until after their trip to [place] which is certainly very reasonable.  2.  Status post mitral valve repair.  Continue cardiac rehab, encouraged exercise on his own.      Follow-up 3 months    ______________________________________________________________________    Subjective:    I had the opportunity to see Alvarado Quan at the [facility]. Alvarado Quan is a 82 y.o. male with a history of atrial valve prolapse with underwent valve repair at [facility] June 2017.  He had postoperative atrial flutter, and then a recurrence afterwards when I met him.  He required initiation of sotalol which he has continued over the last couple of months since I saw him.  He spontaneously converted to sinus rhythm.  An ECG 7/22 showed sinus bradycardia at 47 beats a minute.  This was accompanied by shortness of breath which led him to the emergency room.  His dose of sotalol was decreased at that time.    He has had some minimal lower extremity edema but is otherwise felt well.  He is gradually resuming activities with phase 2 cardiac rehab.  His systolic blood pressures mostly been in the 120s though occasionally his blood pressures elevated prior to rehab.  It does come down afterwards.  He has been active, playing with full ball with grandchildren.  He has had no syncope or falls.    He and his wife are traveling to [place] to their home there in the next couple of weeks.    ______________________________________________________________________    Problem List:  Patient Active Problem List   Diagnosis     Status post mitral valve repair     Typical atrial flutter     Anticoagulation goal of INR 2 to 3      Heart failure with preserved ejection fraction     Medical History:  Past Medical History:   Diagnosis Date     Arrhythmia     Atrial flutter     Atrial fibrillation      Heart murmur     Mitral valve prolapse     Hypertension      Mitral regurgitation     severe     Right bundle branch block      Surgical History:  Past Surgical History:   Procedure Laterality Date     MITRAL VALVE REPAIR  06/2017    Julissa at [place]-plicated suture of posterior leaflet with annuloplasty band     Social History:  Social History     Social History     Marital status:      Spouse name: N/A     Number of children: N/A     Years of education: N/A     Occupational History     Not on file.     Social History Main Topics     Smoking status: Never Smoker     Smokeless tobacco: Never Used     Alcohol use Not on file      Comment: occ     Drug use: No     Sexual activity: Not on file     Other Topics Concern     Not on file     Social History Narrative     Sleep History:  Restorative  Exercise History:  Yard work, phase 2 cardiac rehab    Review of Systems:   General: WNL  Eyes: WNL  Ears/Nose/Throat: WNL  Lungs: WNL  Heart: WNL  Stomach: WNL  Bladder: WNL  Muscle/Joints: WNL  Skin: WNL  Nervous System: WNL  Mental Health: WNL     Blood: WNL          Family History:  Family History   Problem Relation Age of Onset     Hypertension Father      Stroke Brother          Allergies:  No Known Allergies  Medications:  Current Outpatient Prescriptions   Medication Sig Dispense Refill     acetaminophen (TYLENOL) 325 MG tablet Take 325 mg by mouth every 6 (six) hours as needed for pain.       aspirin 81 mg chewable tablet Chew 81 mg daily.       furosemide (LASIX) 20 MG tablet Take 1 tablet (20 mg total) by mouth daily. 30 tablet 6     lisinopril (PRINIVIL,ZESTRIL) 10 MG tablet Take 10 mg by mouth daily.       sotalol (BETAPACE) 80 MG tablet Take 0.5 tablets (40 mg total) by mouth 2 (two) times a day. 15 tablet 0     warfarin (COUMADIN) 2 MG  "tablet Take 4 mg by mouth daily. . Adjust dose based on INR results as directed.       No current facility-administered medications for this visit.        Objective:   Wt Readings from Last 3 Encounters:   09/01/17 140 lb (63.5 kg)   08/25/17 139 lb 8 oz (63.3 kg)   08/04/17 138 lb (62.6 kg)     Vital signs:  /68 (Patient Site: Left Arm, Patient Position: Sitting, Cuff Size: Adult Regular)  Pulse 64  Resp 18  Ht 5' 11\" (1.803 m)  Wt 140 lb (63.5 kg)  BMI 19.53 kg/m2      Physical Exam:    GENERAL APPEARANCE: Alert, cooperative and in no acute distress.  HEENT: Conjunctivae not injected.  No scleral icterus. No Xanthelasma. Oral mucous membranes pink and moist.  NECK: No JVD.  No Hepatojugular reflux. Thyroid not enlarged.  CHEST: clear to auscultation.  Well-healed sternal incision with no evidence of instability  CARDIOVASCULAR: Regular S1, S2 without murmur ,clicks or rubs. Brachial, radial and posterior tibial pulses are intact and symmetric. No carotid bruits noted.    ABDOMEN: Nontender. BS+. No bruits.  EXTREMITIES: 1+ bilateral ankle edema.  SKIN:  No rash, bruising    Lab Results:  Electrocardiogram 7/22/2017: Sinus bradycardia at 47 bpm.  Left axis deviation.  Left right bundle branch block.  Lateral T-wave changes, suggestive of ischemia              KAR OWENS MD [facility]  [phone]    This note created using Dragon voice recognition software.  Sound alike errors may have escaped editing.        "

## 2021-06-12 NOTE — PROGRESS NOTES
ITP ASSESSMENT   Assessment Day: 30 Day  Session Number: 10  Precautions: sternal precautions  Diagnosis: Valve (repair of mitral valve)  Risk Stratification: Medium  Referring Provider: Beth Cheema MD  EXERCISE  Exercise Assessment: Reassessment                          Exercise Plan  Goals Next 30 days  ADL'S: pt to resume home projects like insulating as tolerated  once sternal precautions lifted.   Leisure: pt to begin light UE hand weights and gentle wall pushups as tolerated post sternal guidelines.   Work: to begin light raking following sternal precautions as tolerated.    Education Goals: Patient can state cardiac s/s and appropriate emergency response.  Education Goals Met: Has system for taking medication.;Medication review.                        Goals Met  Initial ADL's goals met: pt has begun driving without problem  Initial Leisure goals met: able to get books sorted without fatigue  Intial Work goals met: has not done any yardwork yet.  Initial Progression: Pt gradually able to climb 2 flights slowly without tiring, returning to light yardwork as able.    Exercise Prescription  Exercise Mode: Treadmill;Bike;Nustep;Hallway Walking  Frequency: 2-3 x weekly  Duration: 45-55 min  Intensity / THR: 20-30 beats above resting heart rate  RPE 11-14  Progression / Met level: 3-4 mets  Resistive Training?: Yes (after 6 wks  post op.)    Current Exercise (mins/week): 150    Interventions  Home Exercise:  Mode: walking  Frequency: 4 x weekly  Duration: 30-50 min    Education Material : Educational videos;Provide written material;Individual education and counseling;Offer educational classes    Education Completed  Exercise Education Completed: Medication review;RPE;Home Exercise;Signs and Symptoms;Emergency Plan;Warm up/cool down;Strength training;End point of exercise;Benefits of Exercise            Exercise Follow-up/Discharge  Follow up/Discharge: Pt tolerating gradually increased level  of  "exercise         NUTRITION  Nutrition Assessment: Reassessment    Nutrition Risk Factors:  Nutrition Risk Factors: NA    Nutrition Plan  Interventions  Nutrition Interventions: Diet consult      Education Completed  Nutrition Education Completed: Discussed small frequent meals and nutritional supplements (nutritional  supplements)    Goals  Nutrition Goals (Next 30 days): Patient will maintain current weight or gradual weight gain    Goals Met  Nutrition Goals Met: Provided Rate your Plate Survey;Completed Nutritional Risk Screen;Patient knows appropriate portion size;Patient will maintain current weight or gradual weight gain    Height, Weight, and  BMI  Weight: 136 lb 6.4 oz (61.9 kg)  Height: 5' 11\" (1.803 m)  BMI: 19.03    Nutrition Follow-up  Follow-up/Discharge: Pt now drinking high protein supplemental drinks between meals. (Cont to lose weight but trying to add high protein drinks.)     Other Risk Factors  Other Risk Factor Assessment: Reassessment    HTN Risk Factor: Hypertension    Pre Exercise BP: 112/74  Post Exercise BP: 142/74    Hypertension Plan  Goals  HTN Goals: Follow low sodium diet    Goals Met  HTN Goals Met: Exercises regularly;Take medication as prescribed    HTN Interventions  HTN Interventions: Offer educational classes;Offer educational videos;Provide written material;Therapist/patient discussion;Diet consult    HTN Education Completed  HTN Education Completed: Medication review    Tobacco Risk Factor: NA          Risk Factor Follow-up   Follow-up/Discharge: reports snacks on [place] chips each day.        PSYCHOSOCIAL  Psychosocial Assessment: Reassessment     [place] COOP Q of L Summary Score: 22    ALISHA-D Score: 8    Psychosocial Risk Factor: NA       Goals  Goals (Next 30 days): Patient demonstrates understanding of stress, no goals identified for the next 30 days    Goals Met  Goals Met: Identified Support system;Practicing stress management skills    Psychosocial " Follow-up  Follow-up/Discharge: feels he's managing his stress thru more regular walks         Patient involved in Goal setting?: Yes    Signature: _____________________________________________________________    Date: __________________    Time: __________________

## 2021-06-12 NOTE — PROGRESS NOTES
Alvarado Quan has participated in 5 sessions of Phase II Cardiac Rehab.    Progress Report:   Cardiac Rehab Treatment Progress Report 7/10/2017 7/12/2017 7/14/2017 7/21/2017 7/26/2017   Weight 138 lbs 13 oz 142 lbs 143 lbs 10 oz 143 lbs 141 lbs   Pre Exercise  HR 96 93 104 47 47   Pre Exercise /71 122/74 112/62 102/60 120/62   Treadmill Peak HR - 137 139 68 63   Treadmill Peak Blood Pressure - 124/72 120/72 118/70 124/60   Nustep Peak Heart Rate - 135 139 69 65   Nustep Peak Blood Pressure - 128/80 - 120/70 128/72   Heart Rate 90 103 106 47 47   Post Exercise /78 118/70 112/72 120/70 118/70   ECG A-Flutter A-Flutter A -Flutter SB SR SB SR   Total Exercise Minutes 6 30 35 35 40         Current Status:  After ER visit for arhythmia issue and med adjustment, pt tolerating 2-3 met level exercise with no symptoms and improved stamina.    If Physician recommends change in treatment plan, please place orders.        __________________________________________________      _____________  Signature                                                                                                  Date

## 2021-06-12 NOTE — PROGRESS NOTES
Alvarado Quan has participated in  24 sessions of Phase II Cardiac Rehab.    Progress Report:   Cardiac Rehab Treatment Progress Report 9/1/2017 9/6/2017 9/8/2017 9/8/2017 9/11/2017   Weight 139 lbs 138 lbs 6 oz 138 lbs 10 oz 138 lbs 10 oz 140 lbs   Pre Exercise  HR 54 50 51 - 67   Pre Exercise /60 130/66 130/76 - 124/76   Treadmill Peak HR 92 92 100 - 90   Treadmill Peak Blood Pressure 134/70 156/80 160/80 - 160/100   Nustep Peak Heart Rate - 84 - - -   Nustep Peak Blood Pressure - - - - -   Heart Rate 78 56 55 - 67   Post Exercise /76 122/80 122/70 - 126/78   ECG SB-SR SB-SR SB-SR - SR   Total Exercise Minutes 45 45 36 - 40         Current Status:  Currently exercising without complaints or symptoms. Pt's. Blood pressure is elevated with exercise at times.    If Physician recommends change in treatment plan, please place orders.        __________________________________________________      _____________  Signature                                                                                                  Date

## 2021-06-12 NOTE — PROGRESS NOTES
ITP ASSESSMENT   Assessment Day: 90 Day  Session Number: 24/Last Session  Precautions: sternal precautions  Diagnosis: Valve (repair of mitral valve)  Risk Stratification: Medium  Referring Provider: Beth Cheema MD  EXERCISE  Exercise Assessment: Discharge     6 Minute Walk Test   Pre   Pre Exercise HR: 53 (02= 99 %)                  Pre Exercise BP: 130/76    Peak  Peak HR: 95                 Peak BP: 180/90  Peak feet: 1750  Peak O2 SAT: 100  Peak RPE: 13  Peak MPH: 3.31    Symptoms:  Peak Symptoms: none    5 mins. Post  5 Min Post HR: 73  5 Min Post BP: 140/82                         Exercise Plan  Goals Next 30 days  No Data Recorded  Leisure: Pt will establish a walking routine 4-5 x week for 30-60 minutes    Education Goals: Patient can state cardiac s/s and appropriate emergency response.  Education Goals Met: Has system for taking medication.;Medication review.                        Goals Met  60 day Leisure goals met: Pt. has established a 4-5 day walking routine  No Data Recorded  60 Day Progression: Pt. has completed 24 sessions of Cardiac Rehab  and has achieved his exercise goals so that he is able to travel tp [place] later this month. Pt. may participate in Phase 3 Cardiac Rehab after he returns    30 day ADL'S goals met: Pt has returned to light home projects  following his sternal precautions  30 day Leisure goals met: Pt reports his return to light resistive hand wts and wall push ups are going well and denies sternal discomfort.  30 day Work goals met: Pt has resumed light raking and picking up twigs in the yard following his sternal precautions and without sxs.    Initial ADL's goals met: pt has begun driving without problem  Initial Leisure goals met: able to get books sorted without fatigue  Intial Work goals met: has not done any yardwork yet.  Initial Progression: Pt gradually able to climb 2 flights slowly without tiring, returning to light yardwork as able.    Exercise  "Prescription  Exercise Mode: Treadmill;Bike;Nustep;Hallway Walking;Arm Erg.;Elliptical  Frequency: 2-3 x week  Duration: 45-55 minutes  Intensity / THR: 20-30 beats above resting heart rate  RPE 11-14  Progression / Met level: 3.0- 5.0 met level  Resistive Training?: Yes (using hand wts)    Current Exercise (mins/week): 60    Interventions  Home Exercise:  Mode: walking  Frequency: 4 x week  Duration: 30-45 minutes    Education Material : Educational videos;Provide written material;Individual education and counseling;Offer educational classes    Education Completed  Exercise Education Completed: Medication review;RPE;Home Exercise;Signs and Symptoms;Emergency Plan;Warm up/cool down;Strength training;End point of exercise;Benefits of Exercise;FITT Principles            Exercise Follow-up/Discharge  Follow up/Discharge: Pt has one more session left and is close to meeting all his rehab goals. NUTRITION  Nutrition Assessment: Discharge    Nutrition Risk Factors:  Nutrition Risk Factors: NA    Nutrition Plan  Interventions    Education Completed    Goals    Goals Met  Nutrition Goals Met: Provided Rate your Plate Survey;Completed Nutritional Risk Screen;Patient knows appropriate portion size;Patient will maintain current weight or gradual weight gain    Height, Weight, and  BMI  Weight: 140 lb (63.5 kg)  Height: 5' 11\" (1.803 m)  BMI: 19.34    Nutrition Follow-up  Follow-up/Discharge: Pt now drinking high protein supplemental drinks between meals. (Cont to lose weight but trying to add high protein drinks.)       Other Risk Factors  Other Risk Factor Assessment: Discharge    HTN Risk Factor: Hypertension    Pre Exercise BP: 124/76  Post Exercise BP: 122/70    Hypertension Plan  Goals  HTN Goals: Follow low sodium diet    Goals Met  HTN Goals Met: Exercises regularly;Take medication as prescribed    HTN Interventions  HTN Interventions: Offer educational classes;Offer educational videos;Provide written " material;Therapist/patient discussion;Diet consult    HTN Education Completed  HTN Education Completed: Medication review      Risk Factor Follow-up   Follow-up/Discharge: reports snacks on [place] chips each day.    PSYCHOSOCIAL  Psychosocial Assessment: Discharge     [place] Q of L Summary Score: 15    ALISHA-D Score: 1    Psychosocial Risk Factor: NA    Psychosocial Plan  Interventions  Interventions: Offer educational videos and classes;Provide written material;Individual education and counseling    Education Completed    Goals  Goals (Next 30 days): Patient demonstrates understanding of stress, no goals identified for the next 30 days    Goals Met  Goals Met: Practicing stress management skills;Improvement in [place] COOP score;Identified Support system;Oriented to stress management classes;Identify stressors    Psychosocial Follow-up  Follow-up/Discharge: feels he's managing his stress thru more regular walks           Patient involved in Goal setting?: Yes    Signature: _____________________________________________________________    Date: __________________    Time: __________________

## 2021-06-13 NOTE — PROGRESS NOTES
Cardiac Rehab  Phase III Treatment Plan    Risk Factors  High Cholesterol  HTN, Family Hx    Exercise Prescription  THR: 20-30 beats over resting HR  Frequency: 2-3 x weekly  Duration: 45-55 min    RPE: 11-14, to 15 as tolerated.          Phase 3:  Phase 3 Goal:  Patient will maintain  4-6, up to 7 or 8 met level as tolerated,  for 45-55 minutes of  interval exercise.  Long term goal: Consistent regular exercise.   Phase 3 plan: adjust therapy according to patient's tolerance, vital signs and symptoms. Assess functional status and recommend changes as needed.    Primary MD: Dr Beth Cheema  Phone: [phone]   Cards: Dr Braulio Valencia  Surgeon: Dr Sharmila Velez ([facility])

## 2021-06-13 NOTE — PROGRESS NOTES
Cardiac Rehab  Cardiac Rehab Phase 3 SOC Date: 10/23/2017    Diagnosis: Mitral Valve Stenosis  Date of Onset: 6/22/2017  Procedure: Mitral Valve Repair  Date of Onset: 6/22/2017  ICD/Pacemaker: No Parameters:   ECG History: AFib / Flutter EF%: 59   Past Medical History:   Patient Active Problem List   Diagnosis     Status post mitral valve repair     Typical atrial flutter     Anticoagulation goal of INR 2 to 3     Heart failure with preserved ejection fraction       Precautions/ Physical Limitations: none  Oxygen: No    Social History  Living Accommodations: Home Steps: Yes  Support people at home: wife   Marital Status:   Family is able to assist with cares  Religious/Community involvement: na  Recreation/Hobbies: reads, travels (just returned from trip to [place]), family activities    Current Activity Level  Mobility status: None  Self Cares/ ADL's: independent  Home management: independent  Driving: yes   Sleeping Pattern: good   Appetite: good   / but not doing hi protein drinks. States eating very healthy diet tho doesn't seem to gain weight.    PainNA  Location:   Characteristics:  Intensity: (0-10 scale) 0  Current Pain Management: na  Intervention: na  Response: na    Home exercise/Equipment: walks    Psychosocial/ Emotional Health  1. In the past 12 months, have you been in a relationship where you have been abused physically, emotionally, sexually or financially? No  notified: NA  2. Who do you turn to for emotional support?: wife  3. Do you have cultural or spiritual needs? No  4. Have there been any major life changes in the past 12 months? No

## 2021-06-13 NOTE — PROGRESS NOTES
[facility] Note  :  Assessment:  Mitral valve repair for severe mitral regurgitation at [facility] June 22, 2017      Dr Velez 63 mm Medtronic band implant       Plication of the medial scallop of the posterior leaflet       Postoperative atrial fibrillation converted with IV amiodarone       Postoperative SVT converted with IV metoprolol with some sinus bradycardia (ECG HR=47)   Atrial flutter, postoperatively from mitral valve repair.   .        ECG shows a typical or isthmus dependent type flutter with elevated ventricular response  Right bundle branch block with left axis deviation  Preserved left ventricular function with ejection fraction equals 59% by echocardiogram June 26, 2017    Plan      We will obtain an echocardiogram to further evaluate your heart valves and heart murmur  Taper off sotalol.  Take sotalol 40 mg  Daily for 1 week then stop  Heart rate should increase as the sotalol is stopped  Continue warfarin  Stop furosemide/Lasix.  If you have more fluid retention then restart furosemide 20 mg as needed  Continue cardiac rehab  I am concerned that you seem underweight, make sure you have good nutrition and consider adding supplements such as Ensure  Obtain a 24-hour Holter monitor once you are off sotalol to further assess your heart rhythm-in about 2 weeks  Follow up in 3-6 months        Subjective:    I had the opportunity to see.Alvarado Quan , who is a 82 y.o. male with a known history of mitral valve repair and postoperative atrial dysrhythmias  He takes sotalol 40 mg twice daily and warfarin  Nose consistent bradycardia.  Mostly his heart rates are 46 bpm.  He tells me with exercise it barely gets into the 50s  Is had no awareness of tachypalpitations no awareness of atrial fibrillation or atrial flutter-previously he was quite aware of the atrial flutter when he had the postoperative elevated heart rates  Is been doing cardiac rehab, although it was in [place] for some time  and was not doing much activity  He takes furosemide 20 mg daily but has had no orthopnea PND or pedal edema  He claims to eat well although seems quite thin  His INRs have been well regulated      Problem List:  Patient Active Problem List   Diagnosis     Status post mitral valve repair     Typical atrial flutter     Anticoagulation goal of INR 2 to 3     Heart failure with preserved ejection fraction     Medical History:  Past Medical History:   Diagnosis Date     Arrhythmia     Atrial flutter     Atrial fibrillation      Heart murmur     Mitral valve prolapse     Hypertension      Mitral regurgitation     severe     Right bundle branch block      Surgical History:  Past Surgical History:   Procedure Laterality Date     MITRAL VALVE REPAIR  06/2017    [facility] at [facility]-plicated suture of posterior leaflet with annuloplasty band     Social History:  Social History     Social History     Marital status:      Spouse name: N/A     Number of children: N/A     Years of education: N/A     Occupational History     Not on file.     Social History Main Topics     Smoking status: Never Smoker     Smokeless tobacco: Never Used     Alcohol use Not on file      Comment: occ     Drug use: No     Sexual activity: Not on file     Other Topics Concern     Not on file     Social History Narrative       Review of Systems:      General: WNL  Eyes: WNL  Ears/Nose/Throat: WNL  Lungs: WNL  Heart: WNL  Stomach: WNL  Bladder: WNL  Muscle/Joints: WNL  Skin: WNL  Nervous System: WNL  Mental Health: WNL     Blood: WNL        Family History:  Family History   Problem Relation Age of Onset     Hypertension Father      Stroke Brother          Allergies:  No Known Allergies    Medications:  Current Outpatient Prescriptions   Medication Sig Dispense Refill     lisinopril (PRINIVIL,ZESTRIL) 10 MG tablet Take 10 mg by mouth daily.       warfarin (COUMADIN) 2 MG tablet Take 4 mg by mouth daily. . Adjust dose based on INR results as directed.    "    acetaminophen (TYLENOL) 325 MG tablet Take 325 mg by mouth every 6 (six) hours as needed for pain.       No current facility-administered medications for this visit.          Objective:   Vital signs:  /70 (Patient Site: Left Arm, Patient Position: Sitting, Cuff Size: Adult Regular)  Pulse (!) 48  Resp 16  Ht 5' 11\" (1.803 m)  Wt 135 lb 8 oz (61.5 kg)  BMI 18.9 kg/m2  Heart rate 45 and regular without ectopics  Prominent apical late peaking systolic murmur grade 3/6 no diastolic murmurs no rumble  Patient seems underweight only 135 pounds and being 5 foot 11 inches makes him underweight     Physical Exam:    GENERAL APPEARANCE: Alert, cooperative and in no acute distress.  HEENT: No scleral icterus. No Xanthelasma. Oral mucous membranes pink and moist.  NECK: JVP normal cm. No Hepatojugular reflux. Thyroid not  Palpable  CHEST: clear to auscultation and percussion  CARDIOVASCULAR 3/6 late peaking systolic apical murmur    Brachial, radial  pulses are intact and symmetric.   No carotid bruits noted.  ABDOMEN: Non tender. BS+. No bruits.  EXTREMITIES: No cyanosis, clubbing or edema.    Lab Results:  LIPIDS:  No results found for: CHOL  No results found for: HDL  No results found for: LDLCALC  No results found for: TRIG  No components found for: CHOLHDL    BMP:  Lab Results   Component Value Date    CREATININE 0.91 07/25/2017    BUN 17 07/25/2017     07/25/2017    K 4.3 07/25/2017     (H) 07/25/2017    CO2 26 07/25/2017         This note has been dictated using voice recognition software. Any grammatical or context distortions are unintentional and inherent to the software.  Ben Gutierrez MD  [facility]  [phone]            "

## 2021-06-14 NOTE — PROGRESS NOTES
We will obtain an echocardiogram to further evaluate your heart valves and heart murmur  Taper off sotalol.  Take sotalol 40 mg  Daily for 1 week then stop  Heart rate should increase as the sotalol is stopped  Continue warfarin  Stop furosemide/Lasix.  If you have more fluid retention then restart furosemide 20 mg as needed  Continue cardiac rehab  I am concerned that you seem underweight, make sure you have good nutrition and consider adding supplements such as Ensure  Obtain a 24-hour Holter monitor once you are off sotalol to further assess your heart rhythm-in about 2 weeks  Follow up in 3-6 months     Plan   Echo is satisfactory; mild-moderate MR; but no mitral stenosis  Also mild-moderate AI  Was to taper off sotalol  Considering the valve changes on Echo, I would continue lasix 20 mg/day

## 2021-06-14 NOTE — PROGRESS NOTES
Message forwarded to schedulers to schedule three months follow up with Dr. Gutierrez.   Rosemarie Stevenson RN

## 2021-06-14 NOTE — PROGRESS NOTES
Patient called and RN notified patient of entire message from Dr. Gutierrez regarding ECHO results and reviewed plan.  Patient would like to relay that he is not happy having to take Lasix.  Lasix prescription placed 20 mg daily.  Patient verbalized understanding.  Contact information provided for additional questions and/or concerns.    Note, patient off Sotalol as advised and scheduled Holter monitor appointment on 11/20/17.  Dr. Gutierrez follow up in 3-6 months.    Rosemarie Stevenson RN

## 2021-06-14 NOTE — PROGRESS NOTES
Telephone conversation with Alvarado  He is feeling good, does not feel any sensation of atrial fibrillation  He really would like to get off warfarin : Although is tolerating warfarin without any problems, bleeding and regulation has been good  Echocardiogram showed preserved left ventricular function.  There was mild to moderate mitral regurgitation, mild to moderate aortic insufficiency  No mitral stenosis; evidence of previous mitral valve repair  Sotalol was stopped and he had a Holter monitor  Holter monitor November 21, 2017 showed sinus rhythm with average heart rate 67 with heart rate ranging 53-93 no major bradycardia and only a few PACs but no atrial tachycardia or atrial fibrillation  Plan  We could stop warfarin if he does not have recurrence of atrial fibrillation off antiarrhythmic drugs  I would recommend he remain on warfarin for 3 months  I will ask Rosemarie Stevenson RN  to schedule him for a follow-up with me in 3 months

## 2021-06-16 PROBLEM — D50.9 IRON DEFICIENCY ANEMIA: Status: ACTIVE | Noted: 2021-04-19

## 2021-06-16 PROBLEM — Z51.81 ANTICOAGULATION GOAL OF INR 2 TO 3: Status: ACTIVE | Noted: 2017-07-17

## 2021-06-16 PROBLEM — I10 BENIGN ESSENTIAL HYPERTENSION: Status: ACTIVE | Noted: 2019-01-16

## 2021-06-16 PROBLEM — Z98.890 STATUS POST MITRAL VALVE REPAIR: Status: ACTIVE | Noted: 2017-07-14

## 2021-06-16 PROBLEM — I50.30 HEART FAILURE WITH PRESERVED EJECTION FRACTION (H): Status: ACTIVE | Noted: 2017-08-04

## 2021-06-16 PROBLEM — Z79.01 ANTICOAGULATION GOAL OF INR 2 TO 3: Status: ACTIVE | Noted: 2017-07-17

## 2021-06-16 PROBLEM — I48.3 TYPICAL ATRIAL FLUTTER (H): Status: ACTIVE | Noted: 2017-07-14

## 2021-06-16 NOTE — PATIENT INSTRUCTIONS - HE
Alvarado Quan    Thank you for coming to the [facility] today for a cardiac evaluation  It was my pleasure to see you today  A good contact for any questions would be Didi Reyes  RN @ [phone]    You seem to have lost weight in a bit undernourished  Take Ensure similar supplement daily  Take a multivitamin  Blood work today will include a comprehensive metabolic panel and a CBC  Echocardiogram April 14, 2021 was unchanged compared to January 6, 2020 did show a moderate leakage of the mitral valve which was previously repaired at the [facility] June 22, 2017 otherwise no changes  Follow-up with [facility] for nocturia and likely prostatism  Return in 1 year or sooner as need arises

## 2021-06-16 NOTE — PROGRESS NOTES
[facility] Heart Care Note    Assessment:    Mitral valve repair for severe mitral regurgitation at [facility] June 22, 2017      Dr Velez 63 mm [employer]tr[employer] annulus       Plication of the medial scallop of the posterior leaflet  Echocardiogram November 6, 2017 showed mild to moderate mitral regurgitation; mild to moderate aortic insufficiency      Prominent  Apical  systolic l murmur   Postoperative atrial fibrillation converted with IV amiodarone       Postoperative typical atrial flutter   Postoperative SVT converted with IV metoprolol with some sinus bradycardia (ECG HR=47)  Right bundle branch block with left axis deviation  Preserved left ventricular function with ejection fraction equals 59% by echocardiogram June 26, 2017      Plan:  Your heart rhythm seems quite stable no apparent recurrence of atrial fibrillation  Stop warfarin  Aspirin 81 mg daily  Should you feel that you go back into atrial fibrillation you should call Didi Reyes RN and report the rhythm change  Because your blood pressure remains a bit elevated, remain on lisinopril  Return in 6 months  Obtain echocardiogram prior to that visit to further evaluate heart function and mitral valve function  You could put on a few pounds seem to be underweight        Subjective:    I had the opportunity to see.Alvarado Quan , who is a 82 y.o. male with a known history of mitral valve repair and perioperative atrial dysrhythmia  He has been doing very well over the past month.  He is in cardiac rehab doing well having stable vital signs with no apparent atrial arrhythmias  He feels no palpitations, thinks that he could tell if he has atrial fibrillation and he has had no awareness of atrial fibrillation  Endurance is excellent good endurance good exercise capacity  Now off diuretics with no fluid retention orthopnea or PND  No angina  He very much wants to stop warfarin : Does not feel that he needs to be on blood thinners-we discussed the  merits of anticoagulation how he is at high risk for recurrence of atrial fibrillation considering his mitral valve disease and atrial enlargement and prior history of atrial dysrhythmia  Although I recommended that he remain on warfarin, is quite insistent on stopping the medication and in the absence of recurrence of atrial fibrillation since he is perioperative days, I thought it was reasonable to stop the warfarin and switch to aspirin 81 mg daily  No longer takes furosemide but was advised to take furosemide on occasions of fluid retention or fluid buildup  We also discussed the importance of controlling blood pressure.  His blood pressure is relatively high so I would not recommend stopping his lisinopril; actually if his blood pressure remains high he should increase his lisinopril from 10 mg daily to 20 mg daily            Problem List:  Patient Active Problem List   Diagnosis     Status post mitral valve repair     Typical atrial flutter     Anticoagulation goal of INR 2 to 3     Heart failure with preserved ejection fraction     Medical History:  Past Medical History:   Diagnosis Date     Arrhythmia     Atrial flutter     Atrial fibrillation      Heart murmur     Mitral valve prolapse     Hypertension      Mitral regurgitation     severe     Right bundle branch block      Surgical History:  Past Surgical History:   Procedure Laterality Date     MITRAL VALVE REPAIR  06/2017    Julissa at [place]-plicated suture of posterior leaflet with annuloplasty band     Social History:  Social History     Social History     Marital status:      Spouse name: N/A     Number of children: N/A     Years of education: N/A     Occupational History     Not on file.     Social History Main Topics     Smoking status: Former Smoker     Smokeless tobacco: Never Used     Alcohol use Not on file      Comment: occ     Drug use: No     Sexual activity: Not on file     Other Topics Concern     Not on file     Social History  "Narrative       Review of Systems:      General: WNL  Eyes: WNL  Ears/Nose/Throat: WNL  Lungs: WNL  Heart: WNL  Stomach: WNL  Bladder: WNL  Muscle/Joints: WNL  Skin: WNL  Nervous System: WNL  Mental Health: WNL     Blood: WNL        Family History:  Family History   Problem Relation Age of Onset     Hypertension Father      Stroke Brother          Allergies:  No Known Allergies    Medications:  Current Outpatient Prescriptions   Medication Sig Dispense Refill     lisinopril (PRINIVIL,ZESTRIL) 10 MG tablet Take 10 mg by mouth daily.       warfarin (COUMADIN) 2 MG tablet Take 4 mg by mouth daily. . Adjust dose based on INR results as directed.       acetaminophen (TYLENOL) 325 MG tablet Take 325 mg by mouth every 6 (six) hours as needed for pain.       No current facility-administered medications for this visit.        Objective:   Vital signs:  /76 (Patient Site: Left Arm, Patient Position: Sitting, Cuff Size: Adult Regular)  Pulse 60  Resp 16  Ht 5' 11\" (1.803 m)  Wt 140 lb (63.5 kg)  BMI 19.53 kg/m2      Physical Exam:  Rather thin man  Alert appropriate    GENERAL APPEARANCE: Alert, cooperative and in no acute distress.  HEENT: No scleral icterus. No Xanthelasma. Oral mucous membranes pink and moist.  NECK: JVP normal cm. No Hepatojugular reflux. Thyroid not  Palpable  CHEST: clear to auscultation and percussion  CARDIOVASCULAR: S1, S2  3/6 systolic apical murmur-prominent    Brachial, radial  pulses are intact and symmetric.   No carotid bruits noted.  ABDOMEN: Non tender. BS+. No bruits.  EXTREMITIES: No cyanosis, clubbing or edema.    Lab Results:  LIPIDS:  No results found for: CHOL  No results found for: HDL  No results found for: LDLCALC  No results found for: TRIG  No components found for: CHOLHDL    BMP:  Lab Results   Component Value Date    CREATININE 0.91 07/25/2017    BUN 17 07/25/2017     07/25/2017    K 4.3 07/25/2017     (H) 07/25/2017    CO2 26 07/25/2017         This note " has been dictated using voice recognition software. Any grammatical or context distortions are unintentional and inherent to the software.  Ben Gutierrez MD  [facility]  [phone]

## 2021-06-16 NOTE — PROGRESS NOTES
Labs reviewed   Creatinine 0.93  Electrolytes normal  Liver panel normal  Hemoglobin 9.8-3 years ago hemoglobin is 9.9  MCV  98  Although he is anemic, this has not changed appreciably  Proceed to a urologic evaluation of the [facility]

## 2021-06-16 NOTE — PROGRESS NOTES
[facility] Heart Care Note  :  Assessment:  Mitral valve repair for severe mitral regurgitation at [facility] June 22, 2017      Dr Velez 63 mm [employer]tr[employer] annulus       Plication of the medial scallop of the posterior leaflet  Echocardiogram November 6, 2017 showed mild to moderate mitral regurgitation; mild to moderate aortic insufficiency  Echocardiogram April 14, 2021 showed ejection fraction 58% with moderate mitral regurgitation      Prominent  Apical  systolic l murmur   Postoperative atrial fibrillation converted with IV amiodarone       Postoperative typical atrial flutter   Postoperative SVT converted with IV metoprolol with some sinus bradycardia (ECG HR=47)  Right bundle branch block with left axis deviation  Preserved left ventricular function with ejection fraction equals 59% by echocardiogram June 26, 2017    Plan:    You seem to have lost weight in a bit undernourished  Take Ensure similar supplement daily  Take a multivitamin  Blood work today will include a comprehensive metabolic panel and a CBC  Echocardiogram April 14, 2021 was unchanged compared to January 6, 2020 did show a moderate leakage of the mitral valve which was previously repaired at the [facility] June 22, 2017 otherwise no changes  Follow-up with [facility] for nocturia and likely prostatism  Return in 1 year or sooner as need arises  Subjective:    I had the opportunity to see.Alvarado Quan , who is a 86 y.o. male with a known history of mitral valve repair and postoperative atrial arrhythmias  He has done very well over the past year had no cardiac issues  Unaware of palpitations or arrhythmias  Breathing is satisfactory no orthopnea PND or pedal edema  No chest pain  No syncopal spells  Troubled by nocturia awakens 4 times a night is felt to have prostate issues and is going to the [facility] for prostate evaluation  Echocardiogram April 14, 2021 showed ejection fraction 58% there was biatrial enlargement moderate mitral  regurgitation this was unchanged compared to January 6, 2020    I could not find any blood work  Previously he did run anemia of about 9            Problem List:  Patient Active Problem List   Diagnosis     Status post mitral valve repair     Typical atrial flutter (H)     Anticoagulation goal of INR 2 to 3     Heart failure with preserved ejection fraction (H)     Benign essential hypertension     Iron deficiency anemia     Medical History:  Past Medical History:   Diagnosis Date     Arrhythmia     Atrial flutter     Atrial fibrillation (H)      Heart murmur     Mitral valve prolapse     Hypertension      Mitral regurgitation     severe     Right bundle branch block      Surgical History:  Past Surgical History:   Procedure Laterality Date     MITRAL VALVE REPAIR  06/2017    Julissa at [place]-plicated suture of posterior leaflet with annuloplasty band     Social History:  Social History     Socioeconomic History     Marital status:      Spouse name: Not on file     Number of children: Not on file     Years of education: Not on file     Highest education level: Not on file   Occupational History     Not on file   Social Needs     Financial resource strain: Not on file     Food insecurity     Worry: Not on file     Inability: Not on file     Transportation needs     Medical: Not on file     Non-medical: Not on file   Tobacco Use     Smoking status: Former Smoker     Smokeless tobacco: Never Used   Substance and Sexual Activity     Alcohol use: Not on file     Comment: occ     Drug use: No     Sexual activity: Not on file   Lifestyle     Physical activity     Days per week: Not on file     Minutes per session: Not on file     Stress: Not on file   Relationships     Social connections     Talks on phone: Not on file     Gets together: Not on file     Attends [employer] service: Not on file     Active member of club or organization: Not on file     Attends meetings of clubs or organizations: Not on file      "Relationship status: Not on file     Intimate partner violence     Fear of current or ex partner: Not on file     Emotionally abused: Not on file     Physically abused: Not on file     Forced sexual activity: Not on file   Other Topics Concern     Not on file   Social History Narrative     Not on file       Review of Systems:      General: WNL  Eyes: WNL  Ears/Nose/Throat: WNL  Lungs: WNL  Heart: WNL  Stomach: WNL  Bladder: Frequent Urination at Night  Muscle/Joints: Muscle Pain  Skin: WNL  Nervous System: Daytime Sleepiness  Mental Health: WNL     Blood: WNL        Family History:  Family History   Problem Relation Age of Onset     Hypertension Father      Stroke Brother          Allergies:  No Known Allergies    Medications:  Current Outpatient Medications   Medication Sig Dispense Refill     cholecalciferol, vitamin D3, (VITAMIN D3) 50 mcg (2,000 unit) capsule Take 2,000 Units by mouth daily.       cod liver oil cap Take 1 capsule by mouth daily.       lisinopril (PRINIVIL,ZESTRIL) 10 MG tablet Take 10 mg by mouth daily.       triamcinolone (KENALOG) 0.1 % ointment APPLY TOPICALLY TO TO THE AFFECTED AREA ONE TO FOUR TIMES DAILY AS DIRECTED . DO NOT APPLY TO FACE OR CREASES       acetaminophen (TYLENOL) 325 MG tablet Take 325 mg by mouth every 6 (six) hours as needed for pain.       No current facility-administered medications for this visit.          Objective:   Vital signs:  /80 (Patient Site: Left Arm, Patient Position: Sitting, Cuff Size: Adult Regular)   Pulse 60   Resp 12   Ht 5' 10.75\" (1.797 m)   Wt 127 lb 3.2 oz (57.7 kg)   BMI 17.87 kg/m      Pulse is 66 and regular without ectopy  Quite thin seemed underweight/cachectic  Physical Exam:        GENERAL APPEARANCE: Alert, cooperative and in no acute distress.  HEENT: No scleral icterus. No Xanthelasma. Oral mucous membranes pink and moist.  NECK: JVP normal cm. No Hepatojugular reflux. Thyroid not  Palpable  CHEST: clear to auscultation and " percussion  CARDIOVASCULAR: S1, S2 prominent apical murmur grade 3 out of 6 no diastolic murmur     Brachial, radial  pulses are intact and symmetric.   No carotid bruits noted.  ABDOMEN: Non tender. BS+. No bruits.  EXTREMITIES: No cyanosis, clubbing or edema.    Lab Results:  LIPIDS:  No results found for: CHOL  No results found for: HDL  No results found for: LDLCALC  No results found for: TRIG  No components found for: CHOLHDL    BMP:  Lab Results   Component Value Date    CREATININE 0.96 05/21/2018    BUN 20 05/21/2018     05/21/2018    K 4.7 05/21/2018     (H) 05/21/2018    CO2 21 (L) 05/21/2018         This note has been dictated using voice recognition software. Any grammatical or context distortions are unintentional and inherent to the software.  Ben Gutierrez MD  [facility] HEART [facility]  [phone]

## 2021-06-23 NOTE — PATIENT INSTRUCTIONS - HE
Alvarado Quan    Thank you for coming to the [facility] today for a cardiac evaluation  It was my pleasure to see you today  A good contact for any questions would be Didi Reyes  RN @ [phone]    Plan:  Physical exam does show a heart murmur but echocardiogram  Is quite good, heart was strong and there was only mild leakage of the mitral valve; nice mitral valve repair   Blood pressure is elevated today ; you should follow blood pressure closely and if blood pressure remains greater than 150, I would recommend increasing lisinopril from 10 mg daily to 20 mg daily  Continue your good exercise program  Return in 1 year; obtain echocardiogram at that time

## 2021-06-23 NOTE — PROGRESS NOTES
[facility] Heart Care Note    Assessment:  Mitral valve repair for severe mitral regurgitation at [facility] June 22, 2017      Dr Velez 63 mm [employer]tr[employer] annulus       Plication of the medial scallop of the posterior leaflet  Echocardiogram November 6, 2017 showed mild to moderate mitral regurgitation; mild to moderate aortic insufficiency      Prominent  Apical  systolic murmur   Postoperative atrial fibrillation converted with IV amiodarone       Postoperative typical atrial flutter   Postoperative SVT converted with IV metoprolol with some sinus bradycardia (ECG HR=47)  Right bundle branch block with left axis deviation  Preserved left ventricular function with ejection fraction equals 59% by echocardiogram June 26, 2017      Plan:  Physical exam does show a heart murmur but echocardiogram quite good, heart was strong and there was only mild leakage of the mitral valve  Blood pressure is elevated today should follow blood pressure closely and if blood pressure remains greater than 150, I would recommend increasing lisinopril from 10 mg daily to 20 mg daily  Continue your good exercise program  Return in 1 year; obtain echocardiogram at that time        Subjective:    I had the opportunity to see.Alvarado KAMARA Kadeem , who is a 83 y.o. male with a known history of mitral valve repair  Alvarado has been doing very well  Has been in rehab now on a regular exercise program seems to have good endurance good exercise capacity  No awareness of palpitations or recurrence of atrial fibrillation  Fluid levels have been stable with no orthopnea PND or pedal edema  Blood pressure generally in the 140/150 systolic range while taking lisinopril 10 mg daily  No longer on warfarin but does take aspirin 81 mg daily    Summary       Left ventricle ejection fraction is normal. The calculated left ventricular ejection fraction is 58%.    Posterior mitral leaflet appears thickened and relatively immobile, consistent with previous mitral  valve repair.    No mitral stenosis present. Mild mitral regurgitation.    The aortic root is mildly dilated. The ascending aorta is mildly dilated.    Mild tricuspid valve regurgitation.    Mild aortic regurgitation.         Problem List:  Patient Active Problem List   Diagnosis     Status post mitral valve repair     Typical atrial flutter (H)     Anticoagulation goal of INR 2 to 3     Heart failure with preserved ejection fraction (H)     Medical History:  Past Medical History:   Diagnosis Date     Arrhythmia     Atrial flutter     Atrial fibrillation (H)      Heart murmur     Mitral valve prolapse     Hypertension      Mitral regurgitation     severe     Right bundle branch block      Surgical History:  Past Surgical History:   Procedure Laterality Date     MITRAL VALVE REPAIR  06/2017    Julissa at [place]-plicated suture of posterior leaflet with annuloplasty band     Social History:  Social History     Socioeconomic History     Marital status:      Spouse name: Not on file     Number of children: Not on file     Years of education: Not on file     Highest education level: Not on file   Social Needs     Financial resource strain: Not on file     Food insecurity - worry: Not on file     Food insecurity - inability: Not on file     Transportation needs - medical: Not on file     Transportation needs - non-medical: Not on file   Occupational History     Not on file   Tobacco Use     Smoking status: Former Smoker     Smokeless tobacco: Never Used   Substance and Sexual Activity     Alcohol use: Not on file     Comment: occ     Drug use: No     Sexual activity: Not on file   Other Topics Concern     Not on file   Social History Narrative     Not on file       Review of Systems:      General: WNL  Eyes: WNL  Ears/Nose/Throat: WNL  Lungs: WNL  Heart: WNL  Stomach: WNL  Bladder: Frequent Urination at Night  Muscle/Joints: WNL  Skin: WNL  Nervous System: WNL  Mental Health: WNL     Blood: WNL        Family  "History:  Family History   Problem Relation Age of Onset     Hypertension Father      Stroke Brother          Allergies:  No Known Allergies    Medications:  Current Outpatient Medications   Medication Sig Dispense Refill     acetaminophen (TYLENOL) 325 MG tablet Take 325 mg by mouth every 6 (six) hours as needed for pain.       aspirin 81 MG EC tablet Take 81 mg by mouth daily.       ferrous gluconate (FERGON) 324 MG tablet Take 1 tablet by mouth daily.  0     lisinopril (PRINIVIL,ZESTRIL) 10 MG tablet Take 10 mg by mouth daily.       warfarin (COUMADIN) 2 MG tablet Take 4 mg by mouth daily. . Adjust dose based on INR results as directed.       No current facility-administered medications for this visit.        Objective:   Vital signs:  /60 (Patient Site: Left Arm, Patient Position: Sitting, Cuff Size: Adult Regular)   Pulse 60   Resp 20   Ht 5' 10.98\" (1.803 m)   Wt 137 lb 3.2 oz (62.2 kg)   BMI 19.14 kg/m    Blood pressure 160/70 sitting, 170/60 standing  Heart rate is 70 and regular without ectopics    Physical Exam:      GENERAL APPEARANCE: Alert, cooperative and in no acute distress.  HEENT: No scleral icterus. No Xanthelasma. Oral mucous membranes pink and moist.  NECK: JVP normal cm. No Hepatojugular reflux. Thyroid not  Palpable  CHEST: clear to auscultation and percussion  CARDIOVASCULAR: S1, S2 prominent systolic apical murmur grade 3/6 ; no diastolic murmur    Brachial, radial  pulses are intact and symmetric.   No carotid bruits noted.  ABDOMEN: Non tender. BS+. No bruits.  EXTREMITIES: No cyanosis, clubbing or edema.    Lab Results:  LIPIDS:  No results found for: CHOL  No results found for: HDL  No results found for: LDLCALC  No results found for: TRIG  No components found for: CHOLHDL    BMP:  Lab Results   Component Value Date    CREATININE 0.96 05/21/2018    BUN 20 05/21/2018     05/21/2018    K 4.7 05/21/2018     (H) 05/21/2018    CO2 21 (L) 05/21/2018         This note has " been dictated using voice recognition software. Any grammatical or context distortions are unintentional and inherent to the software.  Ben Gutierrez MD  [facility]  [phone]

## 2021-06-25 NOTE — PROGRESS NOTES
Progress Notes by Soco Varela CNP at 8/25/2017 12:50 PM     Author: Soco Varela CNP Service: -- Author Type: Nurse Practitioner    Filed: 8/25/2017  2:11 PM Encounter Date: 8/25/2017 Status: Signed    : Soco Varela CNP (Nurse Practitioner)           Click to link to [facility] HEART [facility] NOTE      Assessment/Recommendations   Assessment:    1.  Heart failure with preserved ejection fraction: He noticed worsening edema when he decreased Lasix to every other day.  He is back on Lasix daily and only has trace edema in his ankles.    2.  Paroxysmal atrial fibrillation: Postop atrial fibrillation.  His heart rate is regular today.  He continues to take sotalol and warfarin.  Resting heart rates at cardiac rehab are 50s-60s and increased to 70s-80s with activity.  His INR is being monitored by his primary care provider.  He states that it was low yesterday and his warfarin dose was increased.    Plan:  1.  Continue Lasix 20 mg daily  2.  Low-sodium diet and daily weights    Alvarado Quan will follow up with Dr. Valencia on September 20 and Dr. Gutierrez on October 31.     History of Present Illness    Mr. Alvarado Quan is a 82 y.o. male seen at [facility] heart failure clinic today for continued follow-up.  He had mitral valve repair at [facility] on June 22, 2017.  He had postop atrial fibrillation and is on sotalol and warfarin.  Echocardiogram at [place] on June 26, 2017 showed ejection fraction of 59%.  He was in the emergency room on July 22, 2017 with shortness of breath and edema related to acute heart failure with preserved ejection fraction.    During the last clinic visit, his Lasix was decreased to every other day.  He did this for a few days but noticed lower extremity edema and increased back to daily dosing.  He continues to participate in cardiac rehab.  He denies any symptoms of acute heart failure today.  He denies fatigue,  lightheadedness, shortness of breath, dyspnea on exertion, orthopnea and chest pain.       Physical Examination Review of Systems   Vitals:    08/25/17 1307   BP: 134/64   Pulse: (!) 52   Resp: 16     Body mass index is 19.46 kg/(m^2).  Wt Readings from Last 3 Encounters:   08/25/17 139 lb 8 oz (63.3 kg)   08/04/17 138 lb (62.6 kg)   07/25/17 143 lb 3.2 oz (65 kg)       General Appearance:     Alert, cooperative and in no acute distress.   ENT/Mouth: membranes moist, no oral lesions or bleeding gums.      EYES:  no scleral icterus, normal conjunctivae   Chest/Lungs:   lungs are clear to auscultation, no rales or wheezing, respirations unlabored   Cardiovascular:   Regular. Normal first and second heart sounds; the radial and posterior tibial pulses are intact, trace edema bilateral lower extremities    Abdomen:  Soft, nontender, nondistended, bowel sounds present   Extremities: no cyanosis or clubbing   Skin: warm, dry.    Neurologic: mood and affect are appropriate, alert and oriented x3      General: WNL  Eyes: WNL  Ears/Nose/Throat: WNL  Lungs: WNL  Heart: WNL  Stomach: WNL  Bladder: WNL  Muscle/Joints: WNL  Skin: WNL  Nervous System: WNL  Mental Health: WNL     Blood: WNL     Medical History  Surgical History Family History Social History   Past Medical History:   Diagnosis Date   ? Arrhythmia     Atrial flutter   ? Atrial fibrillation    ? Heart murmur     Mitral valve prolapse   ? Hypertension    ? Mitral regurgitation     severe   ? Right bundle branch block     Past Surgical History:   Procedure Laterality Date   ? MITRAL VALVE REPAIR  06/2017    Julissa at [place]-plicated suture of posterior leaflet with annuloplasty band    Family History   Problem Relation Age of Onset   ? Hypertension Father    ? Stroke Brother     Social History     Social History   ? Marital status:      Spouse name: N/A   ? Number of children: N/A   ? Years of education: N/A     Occupational History   ? Not on file.     Social  History Main Topics   ? Smoking status: Never Smoker   ? Smokeless tobacco: Never Used   ? Alcohol use Not on file      Comment: occ   ? Drug use: No   ? Sexual activity: Not on file     Other Topics Concern   ? Not on file     Social History Narrative          Medications  Allergies   Current Outpatient Prescriptions   Medication Sig Dispense Refill   ? acetaminophen (TYLENOL) 325 MG tablet Take 325 mg by mouth every 6 (six) hours as needed for pain.     ? aspirin 81 mg chewable tablet Chew 81 mg daily.     ? furosemide (LASIX) 20 MG tablet Take 1 tablet (20 mg total) by mouth daily. 30 tablet 6   ? lisinopril (PRINIVIL,ZESTRIL) 10 MG tablet Take 10 mg by mouth daily.     ? sotalol (BETAPACE) 80 MG tablet Take 0.5 tablets (40 mg total) by mouth 2 (two) times a day. 15 tablet 0   ? warfarin (COUMADIN) 2 MG tablet Take 4 mg by mouth daily. . Adjust dose based on INR results as directed.       No current facility-administered medications for this visit.       No Known Allergies      Lab Results    Chemistry CBC BNP   Lab Results   Component Value Date    CREATININE 0.91 07/25/2017    BUN 17 07/25/2017     07/25/2017    K 4.3 07/25/2017     (H) 07/25/2017    CO2 26 07/25/2017     Creatinine (mg/dL)   Date Value   07/25/2017 0.91   07/22/2017 0.92   07/17/2017 0.95    Lab Results   Component Value Date    WBC 4.2 07/22/2017    HGB 9.9 (L) 07/22/2017    HCT 31.1 (L) 07/22/2017    MCV 94 07/22/2017     07/22/2017    Lab Results   Component Value Date    BNP 1586 (H) 07/22/2017     BNP (pg/mL)   Date Value   07/22/2017 1586 (H)          20 minutes were spent with the patient with greater than 50% spent on education and counseling.      Soco Varela, [facility] Heart [facility]   Heart Failure Clinic

## 2021-06-25 NOTE — PROGRESS NOTES
Progress Notes by Soco Varela CNP at 8/4/2017 12:50 PM     Author: Soco Varela CNP Service: -- Author Type: Nurse Practitioner    Filed: 8/4/2017  1:50 PM Encounter Date: 8/4/2017 Status: Signed    : Soco Varela CNP (Nurse Practitioner)           Click to link to [facility] HEART CARE NOTE      Assessment/Recommendations   Assessment:    1.  Heart failure with preserved ejection fraction, ejection fraction 59%: He was started on oral Lasix 2 weeks ago in the emergency room.  He has no symptoms of acute heart failure today.  We reviewed heart failure diagnosis, medications, treatment plan, low sodium diet, weight monitoring, and symptom monitoring.  His wife is very concerned about dehydration with Lasix.  He has no symptoms of dehydration.    2.  Status post mitral valve repair: Surgery at [facility] on June 22, 2017.  Continue cardiac rehab.     3.  Paroxysmal atrial fibrillation: Postop atrial fibrillation.  His heart rate is regular today.  He continues to take sotalol and warfarin.  He has his INR checked regularly at his primary care office.    Plan:  1.  Decrease Lasix to 20 mg every other day.  2.  We reviewed signs of fluid retention to monitor for and he will increase Lasix back to 20 mg daily if he develops swelling or shortness of breath.    Alvarado Quan will follow up in the heart failure clinic in 2-3 weeks and with Dr. Valencia on September 20.     History of Present Illness    Mr. Alvarado Quan is a 82 y.o. male seen at [facility] heart failure clinic today for continued follow-up.  He had mitral valve repair at [facility] on June 22, 2017.  He had postop atrial fibrillation and is on sotalol and warfarin.  Echocardiogram at [place] on June 26, 2017 showed an ejection fraction of 59%.  He presented to the emergency room on July 22 with shortness of breath and edema.  He was diagnosed with acute heart failure with  preserved ejection fraction.  BNP was elevated at 1586.  He received IV Lasix and was discharged on oral Lasix.  His sotalol dose was decreased due to bradycardia.    He was seen by Dr. Esparza on July 25 and had no acute heart failure symptoms.  He continues to do well with no fluid retention.  He is participating in cardiac rehab and denies any symptoms.  He denies fatigue, lightheadedness, shortness of breath, dyspnea on exertion, orthopnea, chest pain, abdominal fullness/bloating and lower extremity edema.  His wife is very concerned about Alvarado being on Lasix long-term.  She is worried about dehydration.  He has no symptoms of dehydration today.    He is monitoring home weights which are stable.  He is following a low sodium diet.      Physical Examination Review of Systems   Vitals:    08/04/17 1254   BP: 130/80   Pulse: 60   Resp: 18     Body mass index is 19.25 kg/(m^2).  Wt Readings from Last 3 Encounters:   08/04/17 138 lb (62.6 kg)   07/25/17 143 lb 3.2 oz (65 kg)   07/22/17 146 lb 14.4 oz (66.6 kg)       General Appearance:     Alert, cooperative and in no acute distress.   ENT/Mouth: membranes moist, no oral lesions or bleeding gums.      EYES:  no scleral icterus, normal conjunctivae   Chest/Lungs:   lungs are clear to auscultation, no rales or wheezing, respirations unlabored   Cardiovascular:   Regular. Normal first and second heart sounds; the radial and posterior tibial pulses are intact, Jugular venous pressure normal, trace edema bilateral ankles   Abdomen:  Soft, nontender, nondistended, bowel sounds present   Extremities: no cyanosis or clubbing   Skin: warm, dry.    Neurologic: mood and affect are appropriate, alert and oriented x3      General: WNL  Eyes: WNL  Ears/Nose/Throat: WNL  Lungs: WNL  Heart: WNL  Stomach: WNL  Bladder: WNL  Muscle/Joints: WNL  Skin: WNL  Nervous System: WNL  Mental Health: WNL     Blood: WNL     Medical History  Surgical History Family History Social History   Past  Medical History:   Diagnosis Date   ? Arrhythmia     Atrial flutter   ? Atrial fibrillation    ? Heart murmur     Mitral valve prolapse   ? Hypertension    ? Mitral regurgitation     severe   ? Right bundle branch block     Past Surgical History:   Procedure Laterality Date   ? MITRAL VALVE REPAIR  06/2017    Julissa at [place]-plicated suture of posterior leaflet with annuloplasty band    Family History   Problem Relation Age of Onset   ? Hypertension Father    ? Stroke Brother     Social History     Social History   ? Marital status:      Spouse name: N/A   ? Number of children: N/A   ? Years of education: N/A     Occupational History   ? Not on file.     Social History Main Topics   ? Smoking status: Never Smoker   ? Smokeless tobacco: Never Used   ? Alcohol use Not on file      Comment: occ   ? Drug use: No   ? Sexual activity: Not on file     Other Topics Concern   ? Not on file     Social History Narrative          Medications  Allergies   Current Outpatient Prescriptions   Medication Sig Dispense Refill   ? acetaminophen (TYLENOL) 325 MG tablet Take 325 mg by mouth every 6 (six) hours as needed for pain.     ? aspirin 81 mg chewable tablet Chew 81 mg daily.     ? furosemide (LASIX) 20 MG tablet Take 1 tablet (20 mg total) by mouth every other day. 30 tablet 6   ? lisinopril (PRINIVIL,ZESTRIL) 10 MG tablet Take 10 mg by mouth daily.     ? sotalol (BETAPACE) 80 MG tablet Take 0.5 tablets (40 mg total) by mouth 2 (two) times a day. 15 tablet 0   ? warfarin (COUMADIN) 2 MG tablet Take 4 mg by mouth daily. . Adjust dose based on INR results as directed.       No current facility-administered medications for this visit.       No Known Allergies      Lab Results    Chemistry CBC BNP   Lab Results   Component Value Date    CREATININE 0.91 07/25/2017    BUN 17 07/25/2017     07/25/2017    K 4.3 07/25/2017     (H) 07/25/2017    CO2 26 07/25/2017     Creatinine (mg/dL)   Date Value   07/25/2017 0.91    07/22/2017 0.92   07/17/2017 0.95    Lab Results   Component Value Date    WBC 4.2 07/22/2017    HGB 9.9 (L) 07/22/2017    HCT 31.1 (L) 07/22/2017    MCV 94 07/22/2017     07/22/2017    Lab Results   Component Value Date    BNP 1586 (H) 07/22/2017     BNP (pg/mL)   Date Value   07/22/2017 1586 (H)          30 minutes were spent with the patient with greater than 50% spent on education and counseling.      Soco Varela, [facility] Heart Care   Heart Failure Clinic

## 2021-06-25 NOTE — PROGRESS NOTES
Progress Notes by Buck Esparza MD at 7/25/2017  9:20 AM     Author: Buck Esparza MD Service: -- Author Type: Physician    Filed: 7/25/2017  1:51 PM Encounter Date: 7/25/2017 Status: Signed    : Buck Esparza MD (Physician)           Click to link to [facility] Heart Care     [facility] HEART CARE NOTE       Assessment/Plan:   1.  Acute diastolic congestive heart failure: All her symptoms, laboratory test and imaging findings are supportive of diagnosis of congestive heart failure.  The patient's echocardiogram was reported normal left ventricular ejection fraction 59% per [facility] report.  The patient responded to Lasix 20 mg daily well.  His shortness of breath is much improved.  His leg edema is also improved.  Recheck BNP and BMP today.  Discussed low-salt diet.  Continue Lasix 20 mg daily.  Follow-up with heart failure clinic in 7-10 days, Dr. Valencia in 6 weeks.    2.  S/p mitral valve repair secondary to mitral valve prolapse and severe mitral valve regurgitation: Mild mitral valve regurgitation based on echo report from [facility].    3.  Paroxysmal atrial fibrillation: The patient is in sinus rhythm at this time.  His heart rate is around 60 bpm.  Continue sotalol 40 mg twice a day.  The patient has history of bradycardia secondary to metoprolol and high dose of sotalol.  No indication of pacemaker at this time.  Continue anticoagulation warfarin.    Thank you for the opportunity to be involved in the care of Alvarado Quan. If you have any questions, please feel free to contact me.  The patient will see heart failure clinic in 7-10 days and Dr. Valencia as early as possible.    Much or all of the text in this note was generated through the use of Dragon Dictate voice-to-text software. Errors in spelling or words which seem out of context are unintentional.   Sound alike errors, in particular, may have escaped editing.       History of Present Illness:   It is my pleasure to see Alvarado Quan at  the [facility] Heart Care clinic for evaluation of Follow-up.  Alvarado Quan is a 82 y.o. male with a medical history of mitral valve prolapse with severe regurgitation status post mitral valve repair on June 22, 2017 at [facility], paroxysmal atrial fibrillation/flutter postop, sinus bradycardia on metoprolol and high dose of sotalol, diastolic congestive heart failure, chronic anticoagulation on warfarin and essential hypertension.    The patient states that he recovered from his surgery well.  Currently he is doing cardiac rehab.  Post surgery, the patient has followed up with Dr. Valencia, and also saw Dr. Gutierrez for atrial fibrillation.    The patient's sotalol initially was 40 mg twice a day, was increased to 80 mg twice a day and then 120 mg twice a day.  Then the patient become bradycardia.  Now, the patient has been on sotalol 40 mg twice a day since last Saturday.  He has no palpitations and his heart rate around 60 bpm.  He is on warfarin for chronic anticoagulation.    He went to emergency room 3 days ago because of shortness of breath and also bilateral leg edema.  His BNP was significantly elevated and also chest x-ray showed vascular congestion.  The patient was treated with diuretics Lasix 20 mg daily.  Last 3 days, his shortness of breath was improved and also leg edema was improved.  He feels much better at this time.    Past Medical History:     Patient Active Problem List   Diagnosis   ? Status post mitral valve repair   ? Typical atrial flutter   ? Anticoagulation goal of INR 2 to 3       Past Surgical History:     Past Surgical History:   Procedure Laterality Date   ? MITRAL VALVE REPAIR  06/2017    Julissa at [place]-plicated suture of posterior leaflet with annuloplasty band       Family History:     Family History   Problem Relation Age of Onset   ? Hypertension Father    ? Stroke Brother         Social History:    reports that he has never smoked. He has never used smokeless tobacco. He  "reports that he does not use illicit drugs.    Review of Systems:   General: WNL  Eyes: WNL  Ears/Nose/Throat: WNL  Lungs: WNL  Heart: Leg Swelling  Stomach: WNL  Bladder: WNL  Muscle/Joints: WNL  Skin: WNL  Nervous System: WNL  Mental Health: WNL     Blood: WNL    Meds:     Current Outpatient Prescriptions:   ?  aspirin 81 mg chewable tablet, Chew 81 mg daily., Disp: , Rfl:   ?  furosemide (LASIX) 20 MG tablet, Take 1 tablet (20 mg total) by mouth daily., Disp: 30 tablet, Rfl: 6  ?  lisinopril (PRINIVIL,ZESTRIL) 10 MG tablet, Take 10 mg by mouth daily., Disp: , Rfl:   ?  sotalol (BETAPACE) 80 MG tablet, Take 0.5 tablets (40 mg total) by mouth 2 (two) times a day., Disp: 15 tablet, Rfl: 0  ?  warfarin (COUMADIN) 2 MG tablet, Take 4 mg by mouth daily. . Adjust dose based on INR results as directed., Disp: , Rfl:   ?  acetaminophen (TYLENOL) 325 MG tablet, Take 325 mg by mouth every 6 (six) hours as needed for pain., Disp: , Rfl:     Allergies:   Review of patient's allergies indicates no known allergies.      Objective:      Physical Exam  143 lb 3.2 oz (65 kg)  5' 11\" (1.803 m)  Body mass index is 19.97 kg/(m^2).  /66 (Patient Site: Right Arm, Patient Position: Sitting, Cuff Size: Adult Regular)  Pulse (!) 56  Resp 16  Ht 5' 11\" (1.803 m)  Wt 143 lb 3.2 oz (65 kg)  BMI 19.97 kg/m2    General Appearance:   Awake, Alert, No acute distress.   HEENT:  Pupil equal and reactive to light. No scleral icterus; the mucous membranes were moist.   Neck: No cervical bruits. No JVD. No thyromegaly.     Chest: The spine was straight. The chest was symmetric.   Lungs:   Respirations unlabored; Lungs are clear to auscultation. No crackles. No wheezing.   Cardiovascular:   Regular rhythm and rate, normal first and second heart sounds with II/VI diastolic murmurs at LLSB. No rubs or gallops.    Abdomen:  Soft. No tenderness. Non-distended. Bowels sounds are present   Extremities: Equal tibial pulses. Mild bilateral leg " edema.   Skin: No rashes or ulcers. Warm, Dry.   Musculoskeletal: No tenderness. No deformity.   Neurologic: Mood and affect are appropriate. No focal deficits.         EKG: Personally reviewed  Marked sinus bradycardia with 1st degree A-V block   Left axis deviation   Right bundle branch block   T wave abnormality, consider lateral ischemia   Abnormal ECG   When compared with ECG of 19-JUL-2017 13:09,   MO interval has increased   T wave inversion now evident in Lateral leads     Lab Review   Lab Results   Component Value Date     07/22/2017    K 4.0 07/22/2017     07/22/2017    CO2 24 07/22/2017    BUN 17 07/22/2017    CREATININE 0.92 07/22/2017    CALCIUM 8.8 07/22/2017     Lab Results   Component Value Date    WBC 4.2 07/22/2017    HGB 9.9 (L) 07/22/2017    HCT 31.1 (L) 07/22/2017    MCV 94 07/22/2017     07/22/2017     Lab Results   Component Value Date    BNP 1586 (H) 07/22/2017

## 2021-06-26 ENCOUNTER — HEALTH MAINTENANCE LETTER (OUTPATIENT)
Age: 86
End: 2021-06-26

## 2021-08-27 ENCOUNTER — MEDICAL CORRESPONDENCE (OUTPATIENT)
Dept: HEALTH INFORMATION MANAGEMENT | Facility: CLINIC | Age: 86
End: 2021-08-27

## 2021-08-27 ENCOUNTER — TRANSFERRED RECORDS (OUTPATIENT)
Dept: HEALTH INFORMATION MANAGEMENT | Facility: CLINIC | Age: 86
End: 2021-08-27

## 2021-08-27 ENCOUNTER — LAB REQUISITION (OUTPATIENT)
Dept: LAB | Facility: CLINIC | Age: 86
End: 2021-08-27
Payer: COMMERCIAL

## 2021-08-27 DIAGNOSIS — I10 ESSENTIAL (PRIMARY) HYPERTENSION: ICD-10-CM

## 2021-08-27 DIAGNOSIS — R35.0 FREQUENCY OF MICTURITION: ICD-10-CM

## 2021-08-27 LAB
ALBUMIN SERPL-MCNC: 4 G/DL (ref 3.5–5)
ALP SERPL-CCNC: 66 U/L (ref 45–120)
ALT SERPL W P-5'-P-CCNC: 18 U/L (ref 0–45)
ANION GAP SERPL CALCULATED.3IONS-SCNC: 10 MMOL/L (ref 5–18)
AST SERPL W P-5'-P-CCNC: 29 U/L (ref 0–40)
BILIRUB SERPL-MCNC: 0.6 MG/DL (ref 0–1)
BUN SERPL-MCNC: 23 MG/DL (ref 8–28)
CALCIUM SERPL-MCNC: 10 MG/DL (ref 8.5–10.5)
CHLORIDE BLD-SCNC: 106 MMOL/L (ref 98–107)
CO2 SERPL-SCNC: 26 MMOL/L (ref 22–31)
CREAT SERPL-MCNC: 1.12 MG/DL (ref 0.7–1.3)
GFR SERPL CREATININE-BSD FRML MDRD: 60 ML/MIN/1.73M2
GLUCOSE BLD-MCNC: 104 MG/DL (ref 70–125)
POTASSIUM BLD-SCNC: 4.7 MMOL/L (ref 3.5–5)
PROT SERPL-MCNC: 7 G/DL (ref 6–8)
SODIUM SERPL-SCNC: 142 MMOL/L (ref 136–145)

## 2021-08-27 PROCEDURE — 80053 COMPREHEN METABOLIC PANEL: CPT | Mod: ORL | Performed by: FAMILY MEDICINE

## 2021-08-27 PROCEDURE — 87086 URINE CULTURE/COLONY COUNT: CPT | Mod: ORL | Performed by: FAMILY MEDICINE

## 2021-08-28 LAB — BACTERIA UR CULT: NO GROWTH

## 2021-08-30 ENCOUNTER — TRANSCRIBE ORDERS (OUTPATIENT)
Dept: OTHER | Age: 86
End: 2021-08-30

## 2021-08-30 DIAGNOSIS — R35.0 URINARY FREQUENCY: Primary | ICD-10-CM

## 2021-09-01 ENCOUNTER — MEDICAL CORRESPONDENCE (OUTPATIENT)
Dept: HEALTH INFORMATION MANAGEMENT | Facility: CLINIC | Age: 86
End: 2021-09-01

## 2021-09-01 ENCOUNTER — TRANSCRIBE ORDERS (OUTPATIENT)
Dept: OTHER | Age: 86
End: 2021-09-01

## 2021-09-01 DIAGNOSIS — R35.0 URINARY FREQUENCY: Primary | ICD-10-CM

## 2021-09-02 ENCOUNTER — TELEPHONE (OUTPATIENT)
Dept: VASCULAR SURGERY | Facility: CLINIC | Age: 86
End: 2021-09-02

## 2021-09-02 NOTE — TELEPHONE ENCOUNTER
Previsit call to pt for new consult pae    Spoke to wife and informed her that we will schedule for an appt for consult with Dr Lowe    Date: Tues 9/7 at 11am   Tele visit    Will mail out ipss sheet today     Wife is aware.     Rosemarie HUBBARD RN, BSN  Interventional Radiology/Vascular  Nurse Coordinator   Phone: [phone]  Fax: [phone]

## 2021-09-03 NOTE — TELEPHONE ENCOUNTER
DIAGNOSIS: New PAE   DATE RECEIVED: 9.7.21   NOTES STATUS DETAILS   OFFICE NOTE from referring provider Received 9.1.21 Surendra Cheema   OFFICE NOTE from other specialist CE 5.10.21 Modesto Velazquez  4.30.21 Modesto Ron  More in CE if needed   OPERATIVE REPORT N/A    MEDICATION LIST N/A    PERTINENT LABS Internal 8.27.21 Herminio Brito   CTA (CT ANGIOGRAPHY) N/A    CT PACS 5.7.21 CT urogram, [place]     MRI N/A    ULTRASOUND N/A      Action 9.3.21 MJ   Action Taken Sent request to [facility] for all related med recs and images.      Action 9.7.21 MJ   Action Taken Called [place] to have them push CT image  Called [facility] at [phone]- on hold for 20 minutes no answer.  Sent urgent fax.    Images pulled into PACS.     Action 9.8.21 MJ   Action Taken Received records from [facility]- sent to urgent scanning.

## 2021-09-07 ENCOUNTER — VIRTUAL VISIT (OUTPATIENT)
Dept: VASCULAR SURGERY | Facility: CLINIC | Age: 86
End: 2021-09-07
Attending: FAMILY MEDICINE
Payer: MEDICARE

## 2021-09-07 ENCOUNTER — PRE VISIT (OUTPATIENT)
Dept: VASCULAR SURGERY | Facility: CLINIC | Age: 86
End: 2021-09-07

## 2021-09-07 DIAGNOSIS — N50.1 VASCULAR DISORDERS OF MALE GENITAL ORGANS: ICD-10-CM

## 2021-09-07 DIAGNOSIS — N40.1 BENIGN PROSTATIC HYPERPLASIA WITH URINARY OBSTRUCTION: Primary | ICD-10-CM

## 2021-09-07 DIAGNOSIS — N13.8 BENIGN PROSTATIC HYPERPLASIA WITH URINARY OBSTRUCTION: Primary | ICD-10-CM

## 2021-09-07 PROCEDURE — 99204 OFFICE O/P NEW MOD 45 MIN: CPT | Mod: 95 | Performed by: RADIOLOGY

## 2021-09-07 RX ORDER — FINASTERIDE 5 MG/1
5 TABLET, FILM COATED ORAL DAILY
COMMUNITY
Start: 2021-05-10 | End: 2022-01-25

## 2021-09-07 RX ORDER — TAMSULOSIN HYDROCHLORIDE 0.4 MG/1
0.4 CAPSULE ORAL DAILY
COMMUNITY
Start: 2021-05-04 | End: 2022-05-03

## 2021-09-07 NOTE — LETTER
9/7/2021        RE: Alvarado Quan  [address]  [place] [address]     Dear Colleague,    Thank you for referring your patient, Alvarado Quan, to the [facility] [place] at [facility]. Please see a copy of my visit note below.    [facility] Vascular    Mr. Paulson is a pleasant 86 year old patient who is referred by Dr. Cheema for evaluation for prostate artery embolization. He was accompanied by his wife and daughter.  He has a large prostate measuring over 140 gr with LUTs related to BPH. His cystoscopy was normal for his age. The Qmax is 4.4 and his PVR at 150 ml.  His IPSS is 18 today on medication.   His main complaints are Nocturia and frequency.    I have looked at his labs and imaging.    Past Medical History:   Diagnosis Date     Arrhythmia     Atrial flutter     Atrial fibrillation (H)      Heart murmur     Mitral valve prolapse     Hypertension      Mitral regurgitation     severe     Right bundle branch block        Past Surgical History:   Procedure Laterality Date     MITRAL VALVE REPAIR  06/2017    Julissa at [place]-plicated suture of posterior leaflet with annuloplasty band       Family History   Problem Relation Age of Onset     Hypertension Father      Cerebrovascular Disease Brother        Social History     Tobacco Use     Smoking status: Former Smoker     Smokeless tobacco: Never Used   Substance Use Topics     Alcohol use: Not on file     Comment: Alcoholic Drinks/day: occ       Current Outpatient Medications   Medication     acetaminophen (TYLENOL) 325 MG tablet     finasteride (PROSCAR) 5 MG tablet     lisinopril (PRINIVIL,ZESTRIL) 10 MG tablet     tamsulosin (FLOMAX) 0.4 MG capsule     triamcinolone (KENALOG) 0.1 % ointment     cholecalciferol, vitamin D3, (VITAMIN D3) 50 mcg (2,000 unit) capsule     cod liver oil cap     No current facility-administered medications for this visit.     Impression and  plan:    Patient with mitral valve prolapse and regurgitation, with LUTS due to BPH referred for PAE. We reviewed the procedure, its risks and benefits. We need to perform a CTA to evaluate the arterial anatomy, new labs and urinalysis. Based on that we could evaluate if he is a good candidate. The patient and the family agreed and would like to proceed.       Total: 45 min        Again, thank you for allowing me to participate in the care of your patient.      Sincerely,    Diane Lowe MD

## 2021-09-07 NOTE — PROGRESS NOTES
[facility]      Type of Visit: Virtual: [employer] - [phone]    Patient is here for a new visit to discuss PAE consult.          Vitals:  No vitals were obtained today due to virtual visit.      Questions patient would like addressed today are: Patient verbalized no questions/concerns, this has been communicated to the provider.     Refills are needed: No    How would you like to obtain your AVS? Trice Rg LPN       Video call: 27 min      Mr. Paulson is a pleasant 86 year old patient who is referred by Dr. Cheema for evaluation for prostate artery embolization. He was accompanied by his wife and daughter.  He has a large prostate measuring over 140 gr with LUTs related to BPH. His cystoscopy was normal for his age. The Qmax is 4.4 and his PVR at 150 ml.  His IPSS is 18 today on medication.   His main complaints are Nocturia and frequency.    I have looked at his labs and imaging.    Past Medical History:   Diagnosis Date     Arrhythmia     Atrial flutter     Atrial fibrillation (H)      Heart murmur     Mitral valve prolapse     Hypertension      Mitral regurgitation     severe     Right bundle branch block        Past Surgical History:   Procedure Laterality Date     MITRAL VALVE REPAIR  06/2017    [facility] at [place]-plicated suture of posterior leaflet with annuloplasty band       Family History   Problem Relation Age of Onset     Hypertension Father      Cerebrovascular Disease Brother        Social History     Tobacco Use     Smoking status: Former Smoker     Smokeless tobacco: Never Used   Substance Use Topics     Alcohol use: Not on file     Comment: Alcoholic Drinks/day: occ       Current Outpatient Medications   Medication     acetaminophen (TYLENOL) 325 MG tablet     finasteride (PROSCAR) 5 MG tablet     lisinopril (PRINIVIL,ZESTRIL) 10 MG tablet     tamsulosin (FLOMAX) 0.4 MG capsule     triamcinolone (KENALOG) 0.1 % ointment     cholecalciferol, vitamin D3, (VITAMIN D3)  50 mcg (2,000 unit) capsule     cod liver oil cap     No current facility-administered medications for this visit.     Impression and plan:    Patient with mitral valve prolapse and regurgitation, with LUTS due to BPH referred for PAE. We reviewed the procedure, its risks and benefits. We need to perform a CTA to evaluate the arterial anatomy, new labs and urinalysis. Based on that we could evaluate if he is a good candidate. The patient and the family agreed and would like to proceed.       Total: 45 min

## 2021-09-07 NOTE — PATIENT INSTRUCTIONS
1. We will have you get a  CTA Pelvis to look at the arteries that feed the prostate in preparation for the procedure.   2. We will also have you get an updated set of labs so that we can check your kidney functions.     *our  will call you with these appointment details.     3. Once these are completed then we will plan for treatment of PAE.   4. We will also send a prior authorization to your insurance prior to performing this procedure.       You have consulted with Dr. Lowe regarding: Prostate Artery Embolization     Please review below and let me know if there are any questions regarding this patient education worksheet.    Location: [facility] Waiting Room, 1st floor, [facility], [address], [place] [id number].     Reminders:  1. No food 8 hours prior to your procedure.   2. No Clear liquids 2 hours prior to your procedure.   3. Please make sure to have a  as this is an outpatient procedure.   4. You do not need a history and physical for this procedure.   5. You may resume normal activities the next day as long as you are not have complications.   6. Covid testing is mandatory for now for all patients having a procedure at the [facility]. This must be done 4 days in advance. Please read below for more information.     Preparing for your PAE Procedure: These prescriptions will be sent to you when we have your procedure scheduled.        1. 2 days before the procedure:      A. Start Omeprazole 20 mg, once daily. This is an acid suppressing medication to help with reflux.   --You will continue to take these medications after the PAE procedure, up to a total of 7 days.      B.  Ibuprofen 800 mg, twice a day. This is an anti-inflammatory that will help with swelling.  -You will continue to take these medications after the PAE procedure, up to a total of 7 days.      C. Cipro 750mg, twice a day. This is an antibiotic to prevent  infection.   -You will continue to take this antibiotic after the PAE procedure, up to a total of 10 days.   We will provide you a prescription for this medication.        What to expect after your PAE:     1. You may develop a fever within the first 24 hours. This is normal. Please take over the counter medication for this such as Tylenol.      2. It may be hard to pass urine at first, in which you may have a burning sensation at first. This should go away.     -You should NOT be urinating bright red blood. If this is the case, then please call us immediately or go to the ER. (Hospital phone number is [phone], ask for the Interventional Radiologist on-call) Urine color may range from clear to pink-tinged, but not BRIGHT RED BLOOD.     - You may also notice small clots when urinating the day of or after the procedure. This can last up to 2-3 days.     3. . Please note that symptoms can get worse prior to seeing improvement. After the procedure, there is still inflammation in which the Ibuprofen will help with. The inflammation should decrease within a couple of days      4. Please do not have intercourse or manual stimulation for at least 2 weeks after procedure.     5. Some men do have penile head pain. If you do have this symptoms please call us immediately.     6. If you have an indwelling catheter, then we will make an appointment with your Urologist for assist in removal every 2 weeks until successful.       If there are any question after hours,  you may call our IR on-call team at the hospital which is, [phone].   Should any of these symptoms worsen and you need to seek immediate help, please go to your local ER. They are more than welcome to contact our On-call team by calling the hospital number listed above.     Please call me with any other questions.       **Please note:      Visiting hours are 8 a.m. to 8:30 p.m.      Arrive wearing a mask over your mouth and nose.  Keep it on during your  visit. If you don t wear a  mask, we ll ask you to leave.    Clean your hands with alcohol hand . Do  this when you arrive at and leave the building and  patient room, and again after you touch your mask  or anything in the room.    We will ask screening questions to check if you are  healthy. You can t visit if you have a fever, cough,  shortness of breath, muscle aches, headaches,  sore throat or diarrhea (loose, watery stools).    Stay 6 feet away from others during your visit and  between visits.    Go directly to and from the room you are visiting.    Stay in the patient s room during your visit. Limit  going to other places in the hospital as much as  you can.    Leave bags and jackets at home or in the car.    For everyone s health, please don t come and go  during your visit. That includes for smoking.      No visitors under age 18 are allowed for adult  hospital patients. Children may have visitors ages  12 and older.    Adult Patients:  Two visitors are allowed at a time for hospital, long-term acute care hospital (LTACH), transitional care unit, antepartum, and acute rehabilitation unit patients staying in a private room.   One visitor is allowed for patients staying in a semi-private room.   Every four days, the visitor(s) can rotate (during the four-day period, the visitor(s) must be consistent).  One visitor is allowed for clinic appointments and emergency department visits. The one visitor may rotate as needed.  Adult surgical patients can have one visitor wait in the surgical waiting lounge if there is adequate space for social distancing. No changes for pediatric patients (children can have visitors throughout the surgery process).        Children under 18:  Two visitors are allowed for hospital visits, clinic appointments, emergency department visits, and surgeries or procedures. The individuals may rotate daily.  Visitors ages 12 and older are allowed.  Family pets can visit (must follow  any applicable pet policies).      COVID TESTING      *it is now expected that ALL patients will have a Covid 19 testing 4 days before their procedure. There is a Covid 19 Team that will be contacting you with more information when it gets closer to your procedure.     **If you do not hear from this team within a week before your scheduled procedure please call their scheduling line at [phone].    *If you decide to have Covid testing outside of the [facility] please make sure that it is completed 4 days prior to procedure and that all results are faxed directly to:    Attn: Interventional Radiology  Fax: [phone].      *WE DO NOT ACCEPT ANY ORAL SWAB OR SPUTUM COVID TESTING. THE TESTING MUST BE A NASAL SWAB.             Rosemarie HUBBARD RN, BSN  Interventional Radiology/Vascular  Nurse Coordinator   Phone: [phone]  Fax: [phone]

## 2021-09-07 NOTE — NURSING NOTE
Chief Complaint   Patient presents with     Consult     Consult for PAE.  Pt reports urgency with urination and nocturia.  Pt inquired on side effects and recovery from procedure as well as success rate.        Medications and allergies reconciled.     Ebony Rg LPN

## 2021-09-09 ENCOUNTER — TELEPHONE (OUTPATIENT)
Dept: GENERAL RADIOLOGY | Facility: CLINIC | Age: 86
End: 2021-09-09

## 2021-09-09 NOTE — TELEPHONE ENCOUNTER
Contacted patient regarding upcoming CTA and need to refrain from use of any erectile dysfunction medications or the use of cocaine 72 hours prior to CTA due to nitroglycerine administration. States understanding.

## 2021-09-13 ENCOUNTER — LAB (OUTPATIENT)
Dept: LAB | Facility: CLINIC | Age: 86
End: 2021-09-13

## 2021-09-13 ENCOUNTER — ANCILLARY PROCEDURE (OUTPATIENT)
Dept: CT IMAGING | Facility: CLINIC | Age: 86
End: 2021-09-13
Attending: RADIOLOGY
Payer: MEDICARE

## 2021-09-13 VITALS — RESPIRATION RATE: 16 BRPM | SYSTOLIC BLOOD PRESSURE: 188 MMHG | DIASTOLIC BLOOD PRESSURE: 89 MMHG | HEART RATE: 75 BPM

## 2021-09-13 DIAGNOSIS — N40.1 BENIGN PROSTATIC HYPERPLASIA WITH URINARY OBSTRUCTION: ICD-10-CM

## 2021-09-13 DIAGNOSIS — N13.8 BENIGN PROSTATIC HYPERPLASIA WITH URINARY OBSTRUCTION: ICD-10-CM

## 2021-09-13 DIAGNOSIS — N50.1 VASCULAR DISORDERS OF MALE GENITAL ORGANS: ICD-10-CM

## 2021-09-13 LAB
ALBUMIN SERPL-MCNC: 3.7 G/DL (ref 3.4–5)
ALP SERPL-CCNC: 65 U/L (ref 40–150)
ALT SERPL W P-5'-P-CCNC: 27 U/L (ref 0–70)
ANION GAP SERPL CALCULATED.3IONS-SCNC: 4 MMOL/L (ref 3–14)
AST SERPL W P-5'-P-CCNC: 31 U/L (ref 0–45)
BILIRUB SERPL-MCNC: 0.6 MG/DL (ref 0.2–1.3)
BUN SERPL-MCNC: 26 MG/DL (ref 7–30)
CALCIUM SERPL-MCNC: 9.7 MG/DL (ref 8.5–10.1)
CHLORIDE BLD-SCNC: 111 MMOL/L (ref 94–109)
CO2 SERPL-SCNC: 28 MMOL/L (ref 20–32)
CREAT SERPL-MCNC: 1.11 MG/DL (ref 0.66–1.25)
ERYTHROCYTE [DISTWIDTH] IN BLOOD BY AUTOMATED COUNT: 14.1 % (ref 10–15)
GFR SERPL CREATININE-BSD FRML MDRD: 61 ML/MIN/1.73M2
GLUCOSE BLD-MCNC: 95 MG/DL (ref 70–99)
HCT VFR BLD AUTO: 33.5 % (ref 40–53)
HGB BLD-MCNC: 10.5 G/DL (ref 13.3–17.7)
INR PPP: 1.08 (ref 0.85–1.15)
MCH RBC QN AUTO: 29.2 PG (ref 26.5–33)
MCHC RBC AUTO-ENTMCNC: 31.3 G/DL (ref 31.5–36.5)
MCV RBC AUTO: 93 FL (ref 78–100)
PLATELET # BLD AUTO: 133 10E3/UL (ref 150–450)
POTASSIUM BLD-SCNC: 4.2 MMOL/L (ref 3.4–5.3)
PROT SERPL-MCNC: 7.1 G/DL (ref 6.8–8.8)
RBC # BLD AUTO: 3.59 10E6/UL (ref 4.4–5.9)
SODIUM SERPL-SCNC: 143 MMOL/L (ref 133–144)
WBC # BLD AUTO: 4.9 10E3/UL (ref 4–11)

## 2021-09-13 PROCEDURE — 85610 PROTHROMBIN TIME: CPT | Performed by: PATHOLOGY

## 2021-09-13 PROCEDURE — 85027 COMPLETE CBC AUTOMATED: CPT | Performed by: PATHOLOGY

## 2021-09-13 PROCEDURE — 80053 COMPREHEN METABOLIC PANEL: CPT | Performed by: PATHOLOGY

## 2021-09-13 PROCEDURE — 36415 COLL VENOUS BLD VENIPUNCTURE: CPT | Performed by: PATHOLOGY

## 2021-09-13 PROCEDURE — 72191 CT ANGIOGRAPH PELV W/O&W/DYE: CPT | Mod: MG | Performed by: RADIOLOGY

## 2021-09-13 PROCEDURE — G1004 CDSM NDSC: HCPCS | Performed by: RADIOLOGY

## 2021-09-13 RX ORDER — IOPAMIDOL 755 MG/ML
100 INJECTION, SOLUTION INTRAVASCULAR ONCE
Status: COMPLETED | OUTPATIENT
Start: 2021-09-13 | End: 2021-09-13

## 2021-09-13 RX ORDER — NITROGLYCERIN 0.4 MG/1
0.4 TABLET SUBLINGUAL EVERY 5 MIN PRN
Status: ACTIVE | OUTPATIENT
Start: 2021-09-13

## 2021-09-13 RX ADMIN — NITROGLYCERIN 0.4 MG: 0.4 TABLET SUBLINGUAL at 09:19

## 2021-09-13 RX ADMIN — IOPAMIDOL 100 ML: 755 INJECTION, SOLUTION INTRAVASCULAR at 09:10

## 2021-09-17 ENCOUNTER — TELEPHONE (OUTPATIENT)
Dept: VASCULAR SURGERY | Facility: CLINIC | Age: 86
End: 2021-09-17

## 2021-09-17 DIAGNOSIS — N13.8 BENIGN PROSTATIC HYPERPLASIA WITH URINARY OBSTRUCTION: Primary | ICD-10-CM

## 2021-09-17 DIAGNOSIS — N40.1 BENIGN PROSTATIC HYPERPLASIA WITH URINARY OBSTRUCTION: Primary | ICD-10-CM

## 2021-09-17 DIAGNOSIS — N50.1 VASCULAR DISORDERS OF MALE GENITAL ORGANS: ICD-10-CM

## 2021-09-17 NOTE — TELEPHONE ENCOUNTER
Called pt to [facility] on his CTA scan    Informed him that Dr. Lowe did review the imaging and that I have some information for him.    Left direct line for him to return my call .    Rosemarie HUBBARD RN, BSN  Interventional Radiology/Vascular  Nurse Coordinator   Phone: [phone]  Fax: [phone]

## 2021-09-20 ENCOUNTER — TEAM CONFERENCE (OUTPATIENT)
Dept: VASCULAR SURGERY | Facility: CLINIC | Age: 86
End: 2021-09-20

## 2021-09-20 ENCOUNTER — TELEPHONE (OUTPATIENT)
Dept: VASCULAR SURGERY | Facility: CLINIC | Age: 86
End: 2021-09-20

## 2021-09-20 NOTE — TELEPHONE ENCOUNTER
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

## 2021-09-20 NOTE — TELEPHONE ENCOUNTER
"Called pt  And spoke to wife and pts daughter on the phone.    Informed them that Dr. Lowe did review the information and that  he states: \"vascular anatomy is very tortuous and therefore the procedure may be technically very challenging. I\"    Dr. Lowe is willing to try however we may not be successful in treatment.     Pt's wife and daughter verbalizes understanding.     The next step is to submit for a prior authorization to his insurance    We talked about the likelihood of approval and if this procedure is denied, wife did ask if they can pay for this out of pocket.     I informed them that I can give them the cost line in which I'm not sure what the total amount is however they can call to inquire.    I will go ahead and submit the information to day and then keep them updated       Wife and daugther verbalized understanding.     Rosemarie HUBBARD RN, BSN  Interventional Radiology/Vascular  Nurse Coordinator   Phone: [phone]  Fax: [phone]      "

## 2021-09-27 NOTE — TELEPHONE ENCOUNTER
Called pt back to inform him that     We did receive a response that there is no PA required for his PAE treatment.     Wife states that they would like to go ahead and schedule pt.    I informed her that I will see what I can do and then call her back.     She agrees to plan.     Rosemarie HUBBARD RN, BSN  Interventional Radiology/Vascular  Nurse Coordinator   Phone: [phone]  Fax: [phone]

## 2021-09-30 DIAGNOSIS — Z11.59 ENCOUNTER FOR SCREENING FOR OTHER VIRAL DISEASES: ICD-10-CM

## 2021-10-01 ENCOUNTER — TELEPHONE (OUTPATIENT)
Dept: VASCULAR SURGERY | Facility: CLINIC | Age: 86
End: 2021-10-01

## 2021-10-01 NOTE — TELEPHONE ENCOUNTER
Called and left a msg for pt on home phone.     Informed them that I do have them scheduled for his PAE treatment with Dr Lowe on Thurs 10/14.    Informed them that I will be sending this information to them on [facility] and sending out a letter    We talked about premeds in which I did ask them to return my call to let me know which pharmacy I can send the medications to.    Left direct line.     Rosemarie HUBBARD RN, BSN  Interventional Radiology/Vascular  Nurse Coordinator   Phone: [phone]  Fax: [phone]

## 2021-10-05 DIAGNOSIS — N13.8 BENIGN PROSTATIC HYPERPLASIA WITH URINARY OBSTRUCTION: Primary | ICD-10-CM

## 2021-10-05 DIAGNOSIS — N40.1 BENIGN PROSTATIC HYPERPLASIA WITH URINARY OBSTRUCTION: Primary | ICD-10-CM

## 2021-10-05 DIAGNOSIS — N50.1 VASCULAR DISORDERS OF MALE GENITAL ORGANS: ICD-10-CM

## 2021-10-05 RX ORDER — CIPROFLOXACIN 750 MG/1
750 TABLET, FILM COATED ORAL 2 TIMES DAILY
Qty: 20 TABLET | Refills: 0 | Status: SHIPPED | OUTPATIENT
Start: 2021-10-12 | End: 2021-10-22

## 2021-10-05 RX ORDER — IBUPROFEN 800 MG/1
800 TABLET, FILM COATED ORAL 2 TIMES DAILY
Qty: 14 TABLET | Refills: 0 | Status: SHIPPED | OUTPATIENT
Start: 2021-10-12 | End: 2021-10-19

## 2021-10-05 NOTE — TELEPHONE ENCOUNTER
2nd attempt to reach pt.     Called and spoke to wife.     She states that she did get my msg that pt's PAE will be on 10/14 and confirmed that this will work for them.     We did go over prep details and that I will be sending information to him via [employer] as well as mail out letter    We talked about meds that we will send to his local pharmacy.    Pt will call with any other questions.     Rosemarie HUBBARD RN, BSN  Interventional Radiology/Vascular  Nurse Coordinator   Phone: [phone]  Fax: [phone]

## 2021-10-12 ENCOUNTER — TELEPHONE (OUTPATIENT)
Dept: INTERVENTIONAL RADIOLOGY/VASCULAR | Facility: CLINIC | Age: 86
End: 2021-10-12

## 2021-10-12 DIAGNOSIS — N13.8 BENIGN PROSTATIC HYPERPLASIA WITH URINARY OBSTRUCTION: ICD-10-CM

## 2021-10-12 DIAGNOSIS — N40.1 BENIGN PROSTATIC HYPERPLASIA WITH URINARY OBSTRUCTION: ICD-10-CM

## 2021-10-12 DIAGNOSIS — N50.1 VASCULAR DISORDERS OF MALE GENITAL ORGANS: ICD-10-CM

## 2021-10-12 NOTE — TELEPHONE ENCOUNTER
I spoke with pt's wife. She stated that pt had his Covid test done at [facility] on 10/11. Pt will bring result with him. August AUGUST

## 2021-10-14 ENCOUNTER — APPOINTMENT (OUTPATIENT)
Dept: MEDSURG UNIT | Facility: CLINIC | Age: 86
End: 2021-10-14
Attending: RADIOLOGY
Payer: MEDICARE

## 2021-10-14 ENCOUNTER — APPOINTMENT (OUTPATIENT)
Dept: INTERVENTIONAL RADIOLOGY/VASCULAR | Facility: CLINIC | Age: 86
End: 2021-10-14
Attending: RADIOLOGY
Payer: MEDICARE

## 2021-10-14 ENCOUNTER — HOSPITAL ENCOUNTER (OUTPATIENT)
Facility: CLINIC | Age: 86
Discharge: HOME | End: 2021-10-14
Attending: RADIOLOGY | Admitting: RADIOLOGY
Payer: MEDICARE

## 2021-10-14 VITALS
TEMPERATURE: 97.4 F | SYSTOLIC BLOOD PRESSURE: 174 MMHG | RESPIRATION RATE: 16 BRPM | HEIGHT: 72 IN | OXYGEN SATURATION: 97 % | BODY MASS INDEX: 17.2 KG/M2 | DIASTOLIC BLOOD PRESSURE: 88 MMHG | HEART RATE: 56 BPM | WEIGHT: 127 LBS

## 2021-10-14 DIAGNOSIS — N50.1 VASCULAR DISORDERS OF MALE GENITAL ORGANS: ICD-10-CM

## 2021-10-14 DIAGNOSIS — N40.1 BENIGN PROSTATIC HYPERPLASIA WITH URINARY OBSTRUCTION: ICD-10-CM

## 2021-10-14 DIAGNOSIS — N13.8 BENIGN PROSTATIC HYPERPLASIA WITH URINARY OBSTRUCTION: ICD-10-CM

## 2021-10-14 LAB
APTT PPP: 32 SECONDS (ref 22–38)
ERYTHROCYTE [DISTWIDTH] IN BLOOD BY AUTOMATED COUNT: 14.5 % (ref 10–15)
HCT VFR BLD AUTO: 33.7 % (ref 40–53)
HGB BLD-MCNC: 10.3 G/DL (ref 13.3–17.7)
INR PPP: 1.09 (ref 0.85–1.15)
MCH RBC QN AUTO: 29.3 PG (ref 26.5–33)
MCHC RBC AUTO-ENTMCNC: 30.6 G/DL (ref 31.5–36.5)
MCV RBC AUTO: 96 FL (ref 78–100)
PLATELET # BLD AUTO: 152 10E3/UL (ref 150–450)
RBC # BLD AUTO: 3.51 10E6/UL (ref 4.4–5.9)
WBC # BLD AUTO: 4.3 10E3/UL (ref 4–11)

## 2021-10-14 PROCEDURE — 36247 INS CATH ABD/L-EXT ART 3RD: CPT | Mod: 50

## 2021-10-14 PROCEDURE — 999N000142 HC STATISTIC PROCEDURE PREP ONLY

## 2021-10-14 PROCEDURE — 99153 MOD SED SAME PHYS/QHP EA: CPT

## 2021-10-14 PROCEDURE — 272N000631 HC COIL/EMBOLIC DEVICE CR12

## 2021-10-14 PROCEDURE — 272N000504 HC NEEDLE CR4

## 2021-10-14 PROCEDURE — 36247 INS CATH ABD/L-EXT ART 3RD: CPT | Mod: 50 | Performed by: RADIOLOGY

## 2021-10-14 PROCEDURE — 272N000566 HC SHEATH CR3

## 2021-10-14 PROCEDURE — C1769 GUIDE WIRE: HCPCS

## 2021-10-14 PROCEDURE — 255N000002 HC RX 255 OP 636: Performed by: RADIOLOGY

## 2021-10-14 PROCEDURE — C1887 CATHETER, GUIDING: HCPCS

## 2021-10-14 PROCEDURE — 85730 THROMBOPLASTIN TIME PARTIAL: CPT | Performed by: NURSE PRACTITIONER

## 2021-10-14 PROCEDURE — 258N000003 HC RX IP 258 OP 636: Performed by: NURSE PRACTITIONER

## 2021-10-14 PROCEDURE — 258N000003 HC RX IP 258 OP 636: Performed by: RADIOLOGY

## 2021-10-14 PROCEDURE — 37243 VASC EMBOLIZE/OCCLUDE ORGAN: CPT | Mod: GC | Performed by: RADIOLOGY

## 2021-10-14 PROCEDURE — 999N000133 HC STATISTIC PP CARE STAGE 2

## 2021-10-14 PROCEDURE — 272N000143 HC KIT CR3

## 2021-10-14 PROCEDURE — 250N000011 HC RX IP 250 OP 636: Performed by: NURSE PRACTITIONER

## 2021-10-14 PROCEDURE — 85610 PROTHROMBIN TIME: CPT | Performed by: NURSE PRACTITIONER

## 2021-10-14 PROCEDURE — 250N000009 HC RX 250: Performed by: STUDENT IN AN ORGANIZED HEALTH CARE EDUCATION/TRAINING PROGRAM

## 2021-10-14 PROCEDURE — 36415 COLL VENOUS BLD VENIPUNCTURE: CPT | Performed by: NURSE PRACTITIONER

## 2021-10-14 PROCEDURE — 85027 COMPLETE CBC AUTOMATED: CPT | Performed by: NURSE PRACTITIONER

## 2021-10-14 PROCEDURE — 93010 ELECTROCARDIOGRAM REPORT: CPT | Mod: 59 | Performed by: INTERNAL MEDICINE

## 2021-10-14 PROCEDURE — 250N000011 HC RX IP 250 OP 636: Performed by: STUDENT IN AN ORGANIZED HEALTH CARE EDUCATION/TRAINING PROGRAM

## 2021-10-14 PROCEDURE — 999N000054 HC STATISTIC EKG NON-CHARGEABLE

## 2021-10-14 PROCEDURE — 99152 MOD SED SAME PHYS/QHP 5/>YRS: CPT | Mod: GC | Performed by: RADIOLOGY

## 2021-10-14 PROCEDURE — 76937 US GUIDE VASCULAR ACCESS: CPT | Mod: 26 | Performed by: RADIOLOGY

## 2021-10-14 PROCEDURE — 272N000188 HC ACCESSORY CR12

## 2021-10-14 PROCEDURE — 272N000637 HC COIL/EMBOLIC DEVICE CR20

## 2021-10-14 PROCEDURE — 37243 VASC EMBOLIZE/OCCLUDE ORGAN: CPT

## 2021-10-14 PROCEDURE — 250N000011 HC RX IP 250 OP 636: Performed by: RADIOLOGY

## 2021-10-14 PROCEDURE — 75736 ARTERY X-RAYS PELVIS: CPT | Mod: XU

## 2021-10-14 PROCEDURE — 99152 MOD SED SAME PHYS/QHP 5/>YRS: CPT

## 2021-10-14 RX ORDER — NALOXONE HYDROCHLORIDE 0.4 MG/ML
0.2 INJECTION, SOLUTION INTRAMUSCULAR; INTRAVENOUS; SUBCUTANEOUS
Status: DISCONTINUED | OUTPATIENT
Start: 2021-10-14 | End: 2021-10-14 | Stop reason: HOSPADM

## 2021-10-14 RX ORDER — SODIUM CHLORIDE 9 MG/ML
INJECTION, SOLUTION INTRAVENOUS CONTINUOUS
Status: DISCONTINUED | OUTPATIENT
Start: 2021-10-14 | End: 2021-10-14 | Stop reason: HOSPADM

## 2021-10-14 RX ORDER — NITROGLYCERIN 5 MG/ML
100-500 VIAL (ML) INTRAVENOUS
Status: DISCONTINUED | OUTPATIENT
Start: 2021-10-14 | End: 2021-10-14 | Stop reason: HOSPADM

## 2021-10-14 RX ORDER — NALOXONE HYDROCHLORIDE 0.4 MG/ML
0.4 INJECTION, SOLUTION INTRAMUSCULAR; INTRAVENOUS; SUBCUTANEOUS
Status: DISCONTINUED | OUTPATIENT
Start: 2021-10-14 | End: 2021-10-14 | Stop reason: HOSPADM

## 2021-10-14 RX ORDER — NITROGLYCERIN 5 MG/ML
100-500 VIAL (ML) INTRAVENOUS
Status: COMPLETED | OUTPATIENT
Start: 2021-10-14 | End: 2021-10-14

## 2021-10-14 RX ORDER — OXYBUTYNIN CHLORIDE 5 MG/1
5 TABLET ORAL 3 TIMES DAILY PRN
Qty: 10 TABLET | Refills: 0 | Status: SHIPPED | OUTPATIENT
Start: 2021-10-14 | End: 2022-05-03

## 2021-10-14 RX ORDER — CEFAZOLIN SODIUM 2 G/100ML
2 INJECTION, SOLUTION INTRAVENOUS
Status: COMPLETED | OUTPATIENT
Start: 2021-10-14 | End: 2021-10-14

## 2021-10-14 RX ORDER — LIDOCAINE 40 MG/G
CREAM TOPICAL
Status: DISCONTINUED | OUTPATIENT
Start: 2021-10-14 | End: 2021-10-14 | Stop reason: HOSPADM

## 2021-10-14 RX ORDER — FLUMAZENIL 0.1 MG/ML
0.2 INJECTION, SOLUTION INTRAVENOUS
Status: DISCONTINUED | OUTPATIENT
Start: 2021-10-14 | End: 2021-10-14 | Stop reason: HOSPADM

## 2021-10-14 RX ORDER — FENTANYL CITRATE 50 UG/ML
25-50 INJECTION, SOLUTION INTRAMUSCULAR; INTRAVENOUS EVERY 5 MIN PRN
Status: DISCONTINUED | OUTPATIENT
Start: 2021-10-14 | End: 2021-10-14 | Stop reason: HOSPADM

## 2021-10-14 RX ORDER — IODIXANOL 320 MG/ML
150 INJECTION, SOLUTION INTRAVASCULAR ONCE
Status: COMPLETED | OUTPATIENT
Start: 2021-10-14 | End: 2021-10-14

## 2021-10-14 RX ORDER — METHYLPREDNISOLONE 4 MG
TABLET, DOSE PACK ORAL
Qty: 21 TABLET | Refills: 0 | Status: SHIPPED | OUTPATIENT
Start: 2021-10-14

## 2021-10-14 RX ADMIN — FENTANYL CITRATE 25 MCG: 50 INJECTION INTRAMUSCULAR; INTRAVENOUS at 12:56

## 2021-10-14 RX ADMIN — MIDAZOLAM 0.5 MG: 1 INJECTION INTRAMUSCULAR; INTRAVENOUS at 13:52

## 2021-10-14 RX ADMIN — MIDAZOLAM 0.5 MG: 1 INJECTION INTRAMUSCULAR; INTRAVENOUS at 14:21

## 2021-10-14 RX ADMIN — MIDAZOLAM 0.5 MG: 1 INJECTION INTRAMUSCULAR; INTRAVENOUS at 15:11

## 2021-10-14 RX ADMIN — LIDOCAINE HYDROCHLORIDE 9 ML: 10 INJECTION, SOLUTION EPIDURAL; INFILTRATION; INTRACAUDAL; PERINEURAL at 13:53

## 2021-10-14 RX ADMIN — IODIXANOL 100 ML: 320 INJECTION, SOLUTION INTRAVASCULAR at 15:43

## 2021-10-14 RX ADMIN — FENTANYL CITRATE 25 MCG: 50 INJECTION INTRAMUSCULAR; INTRAVENOUS at 14:44

## 2021-10-14 RX ADMIN — NITROGLYCERIN 100 MCG: 20 INJECTION INTRAVENOUS at 14:30

## 2021-10-14 RX ADMIN — CEFAZOLIN SODIUM 2 G: 2 INJECTION, SOLUTION INTRAVENOUS at 11:17

## 2021-10-14 RX ADMIN — SODIUM CHLORIDE: 9 INJECTION, SOLUTION INTRAVENOUS at 11:18

## 2021-10-14 RX ADMIN — MIDAZOLAM 0.5 MG: 1 INJECTION INTRAMUSCULAR; INTRAVENOUS at 12:57

## 2021-10-14 RX ADMIN — NITROGLYCERIN 100 MCG: 20 INJECTION INTRAVENOUS at 14:42

## 2021-10-14 RX ADMIN — FENTANYL CITRATE 25 MCG: 50 INJECTION INTRAMUSCULAR; INTRAVENOUS at 15:10

## 2021-10-14 RX ADMIN — MIDAZOLAM 0.5 MG: 1 INJECTION INTRAMUSCULAR; INTRAVENOUS at 14:44

## 2021-10-14 RX ADMIN — NITROGLYCERIN 100 MCG: 20 INJECTION INTRAVENOUS at 14:33

## 2021-10-14 RX ADMIN — FENTANYL CITRATE 25 MCG: 50 INJECTION INTRAMUSCULAR; INTRAVENOUS at 14:22

## 2021-10-14 RX ADMIN — FENTANYL CITRATE 25 MCG: 50 INJECTION INTRAMUSCULAR; INTRAVENOUS at 13:52

## 2021-10-14 RX ADMIN — NITROGLYCERIN 200 MCG: 20 INJECTION INTRAVENOUS at 15:16

## 2021-10-14 ASSESSMENT — MIFFLIN-ST. JEOR: SCORE: 1304.07

## 2021-10-14 NOTE — PROGRESS NOTES
Pt arrived to 2A from home for prostate artery Embolization. VSS. Denies pain. Consent obtained . Lab resulted. Awaiting for consent and updated H&P. Allergies reviewed with pt.  Prep completed. Wife (Zoe [phone]) at bedside, daughter Johana [phone]  will be transporting patient to home

## 2021-10-14 NOTE — SEDATION DOCUMENTATION
[facility] Procedure Note        Sedation:      Performed by: Zenon Vyas DO  Authorized by: Zenon Vyas DO    Pre-Procedure Assessment done at 1200.    Expected Level:  Moderate Sedation    Indication:  Sedation is required to allow for PAE    Consent obtained from patient after discussing the risks, benefits and alternatives.    PO Intake:  Appropriately NPO for procedure    ASA Class:  Class 2 - MILD SYSTEMIC DISEASE, NO ACUTE PROBLEMS, NO FUNCTIONAL LIMITATIONS.    Mallampati:  Grade 2:  Soft palate, base of uvula, tonsillar pillars, and portion of posterior pharyngeal wall visible    Lungs: Lungs Clear with good breath sounds bilaterally.     Heart: Normal heart sounds and rate    History and physical reviewed and no updates needed. I have reviewed the lab findings, diagnostic data, medications, and the plan for sedation. I have determined this patient to be an appropriate candidate for the planned sedation and procedure and have reassessed the patient IMMEDIATELY PRIOR to sedation and procedure.

## 2021-10-14 NOTE — PROGRESS NOTES
Patient arrived to room via litter with ir RN s/p prostate artery embolization  . VSS. Denies pain. Pt alert and oriented x4. Right groin site CDI.

## 2021-10-14 NOTE — PROGRESS NOTES
Patient Name: Alvarado Quan  Medical Record Number: [id number]  Today's Date: 10/14/2021    Procedure: Image Guided Prostate Artery Embolization  Proceduralist: Dr Chrissy SAPP,  Dr Asael DOHERTY    Procedure Start: 1307  Procedure end: 1537  Sedation medications administered: Midazolam 2.5 mg, Fentanyl 125 mcg       Report given to: Rosemarie SENA RN  : N/A    Other Notes: Pt arrived to IR room 1 from . Consent reviewed. Pt denies any questions or concerns regarding procedure. Pt positioned supine and monitored per protocol. Pt tolerated procedure without any noted complications. Pt transferred back to .    Right groin site WDL, +CMS, distal pulses +1. Angioseal Closure device deployed at 1537. FLAT BEDREST FOR 2 HOURS UNTIL 1737.

## 2021-10-14 NOTE — DISCHARGE INSTRUCTIONS
[facility]   Interventional Radiology  Discharge Instructions Post Peripheral Angiogram     AFTER YOU GO HOME          Do relax and take it easy for 24 hours.       Do drink plenty of fluids.       Do resume your regular diet, unless otherwise instructed by your Primary Physician.       DO NOT smoke for at least 24 hours, if you were given any sedation.       DO NOT drink alcoholic beverages the day of your procedure.       Do not drive or operate machinery at home or at work for 24 hours.          DO NOT do any strenuous exercise or lifting for at least 2 days following your Procedure.       DO NOT take a bath or shower for at least 12 hours following your procedure.       DO NOT make any important or legal decisions for 24 hours following your procedure.    CALL THE PHYSICIAN IF:      - You start bleeding from the procedure site.  If you do start to bleed from the site, lie down flat and hold pressure on the site. A small lump or bruise is common at the puncture site.Your physician will tell you if you need to return to the hospital.      - You develop numbness, coolness or a change in color of the arm or leg that was punctured.      - You experience increased pain or redness at the puncture site.      - You develop hives or a rash or unexplained itching.      - You develop a temperature of 101 degrees F or greater  Additional Information:           Support the puncture site for coughing, sneezing, or moving your bowels for the first 48 hours  No tub bath, hot tubs, or swimming for 5 days  No lotion or powder to the puncture site for 3 days      Instructions for closure device: Angioseal Closure device           [facility] INTERVENTIONAL RADIOLOGY DEPARTMENT         Procedure Physician:Dr. Diane Daly                              Date of Procedure: October 14, 2021       Telephone Numbers:   [phone]......Monday-Friday 8:00 to 4:30 pm                                         [phone].....After 4:30 pm Monday-Friday, Weekends and  Holidays. Ask for the Interventional Radiologist on call. Someone is on call 24 hrs/day.

## 2021-10-14 NOTE — PROGRESS NOTES
Patient is tolerating liquids and foods, ambulating, urinating, right groin puncture sites are stable ( no bleeding and no hematoma); CMS intact. A+O x4 and making needs known. VSSA.    IV access removed.  Discharge paperwork reviewed with patient, pt stated understanding.    Daughter Johana  will transport patient home

## 2021-10-14 NOTE — PROCEDURES
[facility]    Procedure: IR Procedure Note    Date/Time: 10/14/2021 3:45 PM  Performed by: Zenon Vyas DO  Authorized by: Zenon Vyas DO   IR Fellow Physician: Asael    UNIVERSAL PROTOCOL   Site Marked: NA  Prior Images Obtained and Reviewed:  Yes  Required items: Required blood products, implants, devices and special equipment available    Patient identity confirmed:  Verbally with patient, arm band, provided demographic data and hospital-assigned identification number  Patient was reevaluated immediately before administering moderate or deep sedation or anesthesia  Confirmation Checklist:  Patient's identity using two indicators, relevant allergies, procedure was appropriate and matched the consent or emergent situation and correct equipment/implants were available  Time out: Immediately prior to the procedure a time out was called    Universal Protocol: the Joint Commission Universal Protocol was followed    Preparation: Patient was prepped and draped in usual sterile fashion           ANESTHESIA    Anesthesia: Local infiltration  Local Anesthetic:  Lidocaine 1% without epinephrine      SEDATION    Patient Sedated: Yes    Sedation Type:  Moderate (conscious) sedation  Sedation:  Fentanyl and midazolam  Vital signs: Vital signs monitored during sedation    See dictated procedure note for full details.  Findings: Uncomplicated bilateral prostatic artery embolization.    Specimens: none    Complications: None    Condition: Stable    Plan: Bedrest for 2 hours with right leg straight.    PROCEDURE   Patient Tolerance:  Patient tolerated the procedure well with no immediate complications    Length of time physician/provider present for 1:1 monitoring during sedation: 165

## 2021-10-15 LAB
ATRIAL RATE - MUSE: 54 BPM
DIASTOLIC BLOOD PRESSURE - MUSE: NORMAL MMHG
INTERPRETATION ECG - MUSE: NORMAL
P AXIS - MUSE: 100 DEGREES
PR INTERVAL - MUSE: 300 MS
QRS DURATION - MUSE: 158 MS
QT - MUSE: 482 MS
QTC - MUSE: 457 MS
R AXIS - MUSE: -47 DEGREES
SYSTOLIC BLOOD PRESSURE - MUSE: NORMAL MMHG
T AXIS - MUSE: 21 DEGREES
VENTRICULAR RATE- MUSE: 54 BPM

## 2021-10-16 ENCOUNTER — HEALTH MAINTENANCE LETTER (OUTPATIENT)
Age: 86
End: 2021-10-16

## 2021-10-19 ENCOUNTER — TELEPHONE (OUTPATIENT)
Dept: VASCULAR SURGERY | Facility: CLINIC | Age: 86
End: 2021-10-19

## 2021-10-19 NOTE — TELEPHONE ENCOUNTER
Called wife to fup on pt s/p pae.     Wife states that pt Is doing well.   Denies any issues.     Denies hematuria, fever, N/V. She states that he hasn't complained of anything however is still urinating quite frequently at night.     I informed her that the improvements in procedure may not show until closer to the 1 mos justine and explained the inflammation process.     She is wondering if pt should go off her prostate medications in which he can after another week to trial if he needs to go back on or stop the medications     Informed her that  We will plan on scheduling a fup appt with Dr Lowe for fup in which our  will reach out to them    Also that we will send an IPSS worksheet for them to fill out and see how they are doing   I informed wife to call me with any questions.    Rosemarie HUBBARD RN, BSN  Interventional Radiology/Vascular  Nurse Coordinator   Phone: [phone]  Fax: [phone]

## 2021-10-20 DIAGNOSIS — N13.8 BENIGN PROSTATIC HYPERPLASIA WITH URINARY OBSTRUCTION: ICD-10-CM

## 2021-10-20 DIAGNOSIS — N40.1 BENIGN PROSTATIC HYPERPLASIA WITH URINARY OBSTRUCTION: ICD-10-CM

## 2021-10-20 DIAGNOSIS — N50.1 VASCULAR DISORDERS OF MALE GENITAL ORGANS: ICD-10-CM

## 2021-11-04 ENCOUNTER — TELEPHONE (OUTPATIENT)
Dept: VASCULAR SURGERY | Facility: CLINIC | Age: 86
End: 2021-11-04

## 2021-11-04 NOTE — TELEPHONE ENCOUNTER
Return call to Zoe.    She is wondering about pt's prostate medications     She is wondering if they can stop the tamsulosin-    She states that pt is doing very well.   She states that pt is going less at night.     I informed her that we do have a fup appt with him on 11/16.     We will send out another ipss worksheet    Also informed her that he can try to go off the tamsulosin and see how he does with urination.    Wife states that pt is doing so much better in which I informed her that I'm glad to hear this.    She will keep us updated.     Rosemarie HUBBARD RN, BSN  Interventional Radiology/Vascular  Nurse Coordinator   Phone: [phone]  Fax: [phone]

## 2021-11-16 ENCOUNTER — VIRTUAL VISIT (OUTPATIENT)
Dept: VASCULAR SURGERY | Facility: CLINIC | Age: 86
End: 2021-11-16
Payer: MEDICARE

## 2021-11-16 DIAGNOSIS — N40.0 BENIGN PROSTATIC HYPERPLASIA WITHOUT LOWER URINARY TRACT SYMPTOMS: Primary | ICD-10-CM

## 2021-11-16 PROCEDURE — 99213 OFFICE O/P EST LOW 20 MIN: CPT | Mod: 95 | Performed by: RADIOLOGY

## 2021-11-16 NOTE — NURSING NOTE
Chief Complaint   Patient presents with     Follow Up     Follow up PAE.  Pt inquiring if there will be continued improvement in sxs.         Medications and allergies reconciled.     Ebony Rg LPN

## 2021-11-16 NOTE — PROGRESS NOTES
INTERVENTIONAL RADIOLOGY ESTABLISHED PATIENT FOLLOW UP      HPI: 86 year old male s/p PAE on 10/14/21 for LUTS in setting of BPH. Patient reports he is doing well. New IPSS score of 10-11, previously 18. HIs symptoms have significantly improved. Estimates urinating about 3 times per night. His wife says he is sleeping better and has more energy during the day. He is very happy with the results. No longer taking finasteride or tamsulosin and is not having issue since then.    ROS:  Negative unless otherwise stated in HPI.      Physical Examination:   VITALS:   There were no vitals taken for this visit.     Breathing comfortably on room air. Remainder of exam deferred due to video visit.       Labs:    BMP RESULTS:  Lab Results   Component Value Date     09/13/2021    POTASSIUM 4.2 09/13/2021    CHLORIDE 111 (H) 09/13/2021    CO2 28 09/13/2021    ANIONGAP 4 09/13/2021    GLC 95 09/13/2021    BUN 26 09/13/2021    CR 1.11 09/13/2021    GFRESTIMATED 61 09/13/2021    GFRESTIMATED >60 04/19/2021    GFRESTBLACK >60 04/19/2021    DALI 9.7 09/13/2021        CBC RESULTS:  Lab Results   Component Value Date    WBC 4.3 10/14/2021    RBC 3.51 (L) 10/14/2021    HGB 10.3 (L) 10/14/2021    HCT 33.7 (L) 10/14/2021    MCV 96 10/14/2021    MCH 29.3 10/14/2021    MCHC 30.6 (L) 10/14/2021    RDW 14.5 10/14/2021     10/14/2021       INR/PTT:  Lab Results   Component Value Date    INR 1.09 10/14/2021    PTT 32 10/14/2021       Diagnostic studies: No new imaging studies to review.    Assessment: 86 year old male s/p PAE on 10/14/21 for LUTS in setting of BPH. Patient reports he is doing well, no issues. New IPSS score of 10-11, previously 18. Has successfuly discontinued his finasteride and tamsulosin.    Plan:  -Follow up in 2 months (3 months post-embolization) with repeat CTA pelvis if patient is amenable. CTA is not critical for follow up appointment as long as symptoms continue to improve.  -Repeat IPSS at next clinic  visit.   -Yearly follow-up thereafter.        I have visited the patient virtually together with the resident and agree with the note.        CC  Patient Care Team:  Beth Cheema MD as PCP - General  Diane Lowe MD as Assigned Heart and Vascular Provider  SELF, REFERRED

## 2021-11-16 NOTE — PROGRESS NOTES
[facility] Vascular      Type of Visit: Virtual: [employer] - [phone]    Patient is here for a return visit to discuss F/U s/p PAE.        Vitals:  No vitals were obtained today due to virtual visit.      Questions patient would like addressed today are: Patient verbalized no questions/concerns, this has been communicated to the provider.     Refills are needed: No    How would you like to obtain your AVS? Trice Rg LPN

## 2021-11-16 NOTE — LETTER
11/16/2021       RE: Alvarado Quan  [address]  [place] [address]     Dear Colleague,    Thank you for referring your patient, Alvarado Quan, to the [facility] VASCULAR CLINIC [place] at [facility]. Please see a copy of my visit note below.        INTERVENTIONAL RADIOLOGY ESTABLISHED PATIENT FOLLOW UP      HPI: 86 year old male s/p PAE on 10/14/21 for LUTS in setting of BPH. Patient reports he is doing well. New IPSS score of 10-11, previously 18. HIs symptoms have significantly improved. Estimates urinating about 3 times per night. His wife says he is sleeping better and has more energy during the day. He is very happy with the results. No longer taking finasteride or tamsulosin and is not having issue since then.    ROS:  Negative unless otherwise stated in HPI.      Physical Examination:   VITALS:   There were no vitals taken for this visit.     Breathing comfortably on room air. Remainder of exam deferred due to video visit.       Labs:    BMP RESULTS:  Lab Results   Component Value Date     09/13/2021    POTASSIUM 4.2 09/13/2021    CHLORIDE 111 (H) 09/13/2021    CO2 28 09/13/2021    ANIONGAP 4 09/13/2021    GLC 95 09/13/2021    BUN 26 09/13/2021    CR 1.11 09/13/2021    GFRESTIMATED 61 09/13/2021    GFRESTIMATED >60 04/19/2021    GFRESTBLACK >60 04/19/2021    DALI 9.7 09/13/2021        CBC RESULTS:  Lab Results   Component Value Date    WBC 4.3 10/14/2021    RBC 3.51 (L) 10/14/2021    HGB 10.3 (L) 10/14/2021    HCT 33.7 (L) 10/14/2021    MCV 96 10/14/2021    MCH 29.3 10/14/2021    MCHC 30.6 (L) 10/14/2021    RDW 14.5 10/14/2021     10/14/2021       INR/PTT:  Lab Results   Component Value Date    INR 1.09 10/14/2021    PTT 32 10/14/2021       Diagnostic studies: No new imaging studies to review.    Assessment: 86 year old male s/p PAE on 10/14/21 for LUTS in setting of BPH. Patient reports he is doing well, no issues. New IPSS score  of 10-11, previously 18. Has successfuly discontinued his finasteride and tamsulosin.    Plan:  -Follow up in 2 months (3 months post-embolization) with repeat CTA pelvis if patient is amenable. CTA is not critical for follow up appointment as long as symptoms continue to improve.  -Repeat IPSS at next clinic visit.   -Yearly follow-up thereafter.        I have visited the patient virtually together with the resident and agree with the note.        CC  Patient Care Team:  Beth Cheema MD as PCP - Diane Junior MD as Assigned Heart and Vascular Provider  [facility], REFERRED        [facility] Vascular      Type of Visit: Virtual: [employer] - [phone]    Patient is here for a return visit to discuss F/U s/p PAE.        Vitals:  No vitals were obtained today due to virtual visit.      Questions patient would like addressed today are: Patient verbalized no questions/concerns, this has been communicated to the provider.     Refills are needed: No    How would you like to obtain your AVS? Trice Rg LPN        Again, thank you for allowing me to participate in the care of your patient.      Sincerely,    Diane Lowe MD

## 2022-01-06 ENCOUNTER — LAB REQUISITION (OUTPATIENT)
Dept: LAB | Facility: CLINIC | Age: 87
End: 2022-01-06
Payer: MEDICARE

## 2022-01-06 DIAGNOSIS — K86.89 OTHER SPECIFIED DISEASES OF PANCREAS: ICD-10-CM

## 2022-01-06 LAB
ALBUMIN SERPL-MCNC: 4 G/DL (ref 3.5–5)
ALP SERPL-CCNC: 55 U/L (ref 45–120)
ALT SERPL W P-5'-P-CCNC: 21 U/L (ref 0–45)
ANION GAP SERPL CALCULATED.3IONS-SCNC: 8 MMOL/L (ref 5–18)
AST SERPL W P-5'-P-CCNC: 33 U/L (ref 0–40)
BILIRUB SERPL-MCNC: 0.8 MG/DL (ref 0–1)
BUN SERPL-MCNC: 21 MG/DL (ref 8–28)
CALCIUM SERPL-MCNC: 10.1 MG/DL (ref 8.5–10.5)
CHLORIDE BLD-SCNC: 108 MMOL/L (ref 98–107)
CO2 SERPL-SCNC: 24 MMOL/L (ref 22–31)
CREAT SERPL-MCNC: 0.99 MG/DL (ref 0.7–1.3)
GFR SERPL CREATININE-BSD FRML MDRD: 75 ML/MIN/1.73M2
GLUCOSE BLD-MCNC: 71 MG/DL (ref 70–125)
INR PPP: 0.99 (ref 0.85–1.15)
POTASSIUM BLD-SCNC: 4.4 MMOL/L (ref 3.5–5)
PROT SERPL-MCNC: 6.8 G/DL (ref 6–8)
SODIUM SERPL-SCNC: 140 MMOL/L (ref 136–145)

## 2022-01-06 PROCEDURE — 84590 ASSAY OF VITAMIN A: CPT | Mod: ORL | Performed by: FAMILY MEDICINE

## 2022-01-06 PROCEDURE — 85610 PROTHROMBIN TIME: CPT | Mod: ORL | Performed by: FAMILY MEDICINE

## 2022-01-06 PROCEDURE — 80053 COMPREHEN METABOLIC PANEL: CPT | Mod: ORL | Performed by: FAMILY MEDICINE

## 2022-01-06 PROCEDURE — 84446 ASSAY OF VITAMIN E: CPT | Mod: ORL | Performed by: FAMILY MEDICINE

## 2022-01-06 PROCEDURE — 82306 VITAMIN D 25 HYDROXY: CPT | Mod: ORL | Performed by: FAMILY MEDICINE

## 2022-01-07 LAB — DEPRECATED CALCIDIOL+CALCIFEROL SERPL-MC: 20 UG/L (ref 30–80)

## 2022-01-08 NOTE — PROGRESS NOTES
[facility] Vascular      Type of Visit: Virtual: [employer]    Patient is here for a return visit to discuss 3 month f/u post PAE.     Vitals - Patient Reported  Weight (Patient Reported): 56.7 kg (125 lb)          Questions patient would like addressed today are: Patient verbalized no questions/concerns, this has been communicated to the provider.     Refills are needed: No    How would you like to obtain your AVS? Trice Blakely, Virtual Facilitator/LPN        INTERVENTIONAL RADIOLOGY ESTABLISHED PATIENT FOLLOW UP      HPI: 86 year old male s/p PAE on 10/14/21 for LUTS in setting of BPH.     Patient reports ongoing improvement in his symptoms. There is variability in his symptoms (primarily based on fluid intake) but overall he is feeling much better. Urinating about 2-4 times per night but sleeping much better through the night despite this.      New IPSS today 10-11 (previously 10-11 and originally 27 prior to PAE).  QOL score: 3 - Mixed.    ROS:  Negative unless otherwise stated in HPI.      Physical Examination:   VITALS:     GENERAL: Healthy, alert and no distress  EYES: Eyes grossly normal to inspection.  No discharge or erythema, or obvious scleral/conjunctival abnormalities.  RESP: No audible wheeze, cough, or visible cyanosis.  No visible retractions or increased work of breathing.    SKIN: Visible skin clear. No significant rash, abnormal pigmentation or lesions.  NEURO: Cranial nerves grossly intact.  Mentation and speech appropriate for age.  PSYCH: Mentation appears normal, affect normal/bright, judgement and insight intact, normal speech and appearance well-groomed.    Labs: No relevant laboratory studies to review.      Diagnostic studies: Personally reviewed and interpreted. No relevant imaging studies to review at this visit.     Assessment: 86 year old male s/p PAE on 10/14/21 for LUTS in setting of BPH. Patient reports he is doing well despite occasional nocturia. Overall, he  is happy with his results at this time. New IPSS score of 10-11, originally 27 pre-embolization.    Plan:  -Follow up in 6 months per patient request, and then at one year. If patient has new clinical concerns, we can see him sooner.      Magan Kumar MD  Interventional Radiology Fellow, PGY-5.        CC  Patient Care Team:  Beth Cheema MD as PCP - Diane Junior MD as Assigned Heart and Vascular Provider  SELF, REFERRED        Video-Visit Details     Type of service:  Video Visit     Video Start and End Time: 0910-0925    Originating Location (pt. Location): Home     Distant Location (provider location):  [facility] VASCULAR [facility]      Platform used for Video Visit: [facility]  Patient Care Team:  Beth Cheema MD as PCP - Diane Junior MD as Assigned Heart and Vascular Provider  SELF, REFERRED      I have visited the patient with the resident and agree with the note.

## 2022-01-09 LAB
A-TOCOPHEROL VIT E SERPL-MCNC: 6.6 MG/L
ANNOTATION COMMENT IMP: NORMAL
BETA+GAMMA TOCOPHEROL SERPL-MCNC: 0.4 MG/L
RETINYL PALMITATE SERPL-MCNC: <0.02 MG/L
VIT A SERPL-MCNC: 0.47 MG/L

## 2022-01-11 ENCOUNTER — VIRTUAL VISIT (OUTPATIENT)
Dept: VASCULAR SURGERY | Facility: CLINIC | Age: 87
End: 2022-01-11
Payer: MEDICARE

## 2022-01-11 DIAGNOSIS — N40.0 BENIGN PROSTATIC HYPERPLASIA WITHOUT LOWER URINARY TRACT SYMPTOMS: Primary | ICD-10-CM

## 2022-01-11 PROCEDURE — 99212 OFFICE O/P EST SF 10 MIN: CPT | Mod: 95 | Performed by: RADIOLOGY

## 2022-01-11 NOTE — NURSING NOTE
Chief Complaint   Patient presents with     Follow Up     3 month follow up, post PAE       Patient verified meds and allergies are correct via patients echeck in. There are few medications flagged for removal.    Latasha Blakely, Virtual Facilitator/LPN

## 2022-01-11 NOTE — LETTER
1/11/2022       RE: Alvarado Quan  [address]  [place] [address]     Dear Colleague,    Thank you for referring your patient, Alvarado Quan, to the [facility] VASCULAR CLINIC [place] at [facility]. Please see a copy of my visit note below.    [facility] Vascular          INTERVENTIONAL RADIOLOGY ESTABLISHED PATIENT FOLLOW UP      HPI: 86 year old male s/p PAE on 10/14/21 for LUTS in setting of BPH.     Patient reports ongoing improvement in his symptoms. There is variability in his symptoms (primarily based on fluid intake) but overall he is feeling much better. Urinating about 2-4 times per night but sleeping much better through the night despite this.      New IPSS today 10-11 (previously 10-11 and originally 27 prior to PAE).  QOL score: 3 - Mixed.    ROS:  Negative unless otherwise stated in HPI.      Physical Examination:   VITALS:     GENERAL: Healthy, alert and no distress  EYES: Eyes grossly normal to inspection.  No discharge or erythema, or obvious scleral/conjunctival abnormalities.  RESP: No audible wheeze, cough, or visible cyanosis.  No visible retractions or increased work of breathing.    SKIN: Visible skin clear. No significant rash, abnormal pigmentation or lesions.  NEURO: Cranial nerves grossly intact.  Mentation and speech appropriate for age.  PSYCH: Mentation appears normal, affect normal/bright, judgement and insight intact, normal speech and appearance well-groomed.    Labs: No relevant laboratory studies to review.      Diagnostic studies: Personally reviewed and interpreted. No relevant imaging studies to review at this visit.     Assessment: 86 year old male s/p PAE on 10/14/21 for LUTS in setting of BPH. Patient reports he is doing well despite occasional nocturia. Overall, he is happy with his results at this time. New IPSS score of 10-11, originally 27 pre-embolization.    Plan:  -Follow up in 6 months per  patient request, and then at one year. If patient has new clinical concerns, we can see him sooner.      Magan Kumar MD  Interventional Radiology Fellow, PGY-5.      CC  Patient Care Team:  Beth Cheema MD as PCP - Diane Junior MD as Assigned Heart and Vascular Provider  SELF, REFERRED      Diane Lowe MD

## 2022-04-25 RX ORDER — IBUPROFEN 800 MG/1
TABLET, FILM COATED ORAL
Qty: 14 TABLET | Refills: 0 | OUTPATIENT
Start: 2022-04-25

## 2022-05-03 ENCOUNTER — OFFICE VISIT (OUTPATIENT)
Dept: CARDIOLOGY | Facility: CLINIC | Age: 87
End: 2022-05-03
Payer: MEDICARE

## 2022-05-03 VITALS
RESPIRATION RATE: 16 BRPM | HEART RATE: 60 BPM | DIASTOLIC BLOOD PRESSURE: 68 MMHG | SYSTOLIC BLOOD PRESSURE: 118 MMHG | BODY MASS INDEX: 17.36 KG/M2 | WEIGHT: 128 LBS

## 2022-05-03 DIAGNOSIS — I50.30 HEART FAILURE WITH PRESERVED EJECTION FRACTION, UNSPECIFIED HF CHRONICITY (H): ICD-10-CM

## 2022-05-03 DIAGNOSIS — I10 BENIGN ESSENTIAL HYPERTENSION: ICD-10-CM

## 2022-05-03 DIAGNOSIS — Z98.890 STATUS POST MITRAL VALVE REPAIR: Primary | ICD-10-CM

## 2022-05-03 LAB
ATRIAL RATE - MUSE: 55 BPM
DIASTOLIC BLOOD PRESSURE - MUSE: NORMAL MMHG
INTERPRETATION ECG - MUSE: NORMAL
P AXIS - MUSE: 82 DEGREES
PR INTERVAL - MUSE: 300 MS
QRS DURATION - MUSE: 158 MS
QT - MUSE: 478 MS
QTC - MUSE: 457 MS
R AXIS - MUSE: -49 DEGREES
SYSTOLIC BLOOD PRESSURE - MUSE: NORMAL MMHG
T AXIS - MUSE: 29 DEGREES
VENTRICULAR RATE- MUSE: 55 BPM

## 2022-05-03 PROCEDURE — 99214 OFFICE O/P EST MOD 30 MIN: CPT | Performed by: INTERNAL MEDICINE

## 2022-05-03 PROCEDURE — 93000 ELECTROCARDIOGRAM COMPLETE: CPT | Performed by: GENERAL ACUTE CARE HOSPITAL

## 2022-05-03 NOTE — PROGRESS NOTES
[facility] Heart Care Note  Assessment:  Mitral valve repair for severe mitral regurgitation at [facility] June 22, 2017      Dr Velez 63 mm [employer] annulus       Plication of the medial scallop of the posterior leaflet  Echocardiogram November 6, 2017 showed mild to moderate mitral regurgitation; mild to moderate aortic insufficiency  Echocardiogram April 14, 2021 showed ejection fraction 58% with moderate mitral regurgitation      Prominent  Apical  systolic l murmur   Postoperative atrial fibrillation converted with IV amiodarone       Postoperative typical atrial flutter   Postoperative SVT converted with IV metoprolol with some sinus bradycardia (ECG HR=47)  Right bundle branch block with left axis deviation  Preserved left ventricular function with ejection fraction equals 59% by echocardiogram June 26, 2017    ECG shows sinus bradycardia at 55 bpm, right bundle branch block, WA prolongation but no acute changes      Plan:    Continue to have frequent episodes of nocturia-urination at night this interferes with your sleep and you are pretty tired all day  He had comprehensive blood work on January 6, 2022 which was all excellent  An episode left-sided chest pain -this seemed to be more of a tenderness ; although might of been related to the heart  EKG today shows no changes  If you have more trouble with chest pain we may need to do further evaluation and if you have prolongs chest pain you should go to the emergency room for evaluation  Continue current medications  Follow-up in 6 months with a general cardiologist-I will have retired by then        Subjective:    I had the opportunity to see.Alvarado Quan , who is a 87 year old male with a known history of valve repair and arrhythmias  Lots of trouble with urination has nocturia and awakens multiple times during the night with urination  Has been seen by interventional radiology and had some type of intervention done that should help strengthen the  prostate not clear that this has helped  Had comprehensive blood work on January this showed comprehensive metabolic panel normal  Hemoglobin 10.3  Had episode of chest pain pretty intense localized might of been associated with tenderness otherwise has no angina or exertional pain no history of coronary artery disease  No awareness of palpitations or arrhythmias no syncopal or near syncopal episodes  Pretty tired sleeps most of the day does not sleep much at night because of his nocturia  Not very energetic or vigorous  Weight has been stable no longer takes supplements seem to have gained a little bit of weight when he was on supplements but has lost interest    Would like to go to [place] for an extended time; told him that he did not seem like he had enough endurance and stamina to make the trip and advised against it          Problem List:  Patient Active Problem List   Diagnosis     Status post mitral valve repair     Typical atrial flutter (H)     Anticoagulation goal of INR 2 to 3     Heart failure with preserved ejection fraction (H)     Benign essential hypertension     Iron deficiency anemia     Medical History:  Past Medical History:   Diagnosis Date     Arrhythmia     Atrial flutter     Atrial fibrillation (H)      Heart murmur     Mitral valve prolapse     Hypertension      Mitral regurgitation     severe     Right bundle branch block      Surgical History:  Past Surgical History:   Procedure Laterality Date     IR PAE  10/14/2021     MITRAL VALVE REPAIR  06/2017    [facility] at [place]-plicated suture of posterior leaflet with annuloplasty band     Social History:  Social History     Socioeconomic History     Marital status:      Spouse name: Not on file     Number of children: Not on file     Years of education: Not on file     Highest education level: Not on file   Occupational History     Not on file   Tobacco Use     Smoking status: Former Smoker     Smokeless tobacco: Never Used   Vaping Use      Vaping Use: Never used   Substance and Sexual Activity     Alcohol use: Yes     Comment: Alcoholic Drinks/day: occ     Drug use: No     Sexual activity: Not on file   Other Topics Concern     Parent/sibling w/ CABG, MI or angioplasty before 65F 55M? Not Asked   Social History Narrative     Not on file     Social Determinants of Health     Financial Resource Strain: Not on file   Food Insecurity: Not on file   Transportation Needs: Not on file   Physical Activity: Not on file   Stress: Not on file   Social Connections: Not on file   Intimate Partner Violence: Not on file   Housing Stability: Not on file       Review of Systems   ,         Family History:  Family History   Problem Relation Age of Onset     Hypertension Father      Cerebrovascular Disease Brother          Allergies:  No Known Allergies    Medications:  Current Outpatient Medications   Medication Sig Dispense Refill     cholecalciferol, vitamin D3, (VITAMIN D3) 50 mcg (2,000 unit) capsule Take 2,000 Units by mouth daily        cod liver oil cap Take 1 capsule by mouth daily        lisinopril (PRINIVIL,ZESTRIL) 10 MG tablet [LISINOPRIL (PRINIVIL,ZESTRIL) 10 MG TABLET] Take 10 mg by mouth daily.       triamcinolone (KENALOG) 0.1 % ointment [TRIAMCINOLONE (KENALOG) 0.1 % OINTMENT] APPLY TOPICALLY TO TO THE AFFECTED AREA ONE TO FOUR TIMES DAILY AS DIRECTED . DO NOT APPLY TO FACE OR CREASES       methylPREDNISolone (MEDROL DOSEPAK) 4 MG tablet therapy pack Follow Package Directions     only needed if having difficulty urinating 21 tablet 0       Objective:   Vital signs:  /68 (BP Location: Right arm, Patient Position: Sitting, Cuff Size: Adult Regular)   Pulse 60   Resp 16   Wt 58.1 kg (128 lb)   BMI 17.36 kg/m        Physical Exam:      GENERAL APPEARANCE: Alert, cooperative and in no acute distress.  HEENT: No scleral icterus. No Xanthelasma. Oral mucous membranes pink and moist.  NECK: JVP normal cm. No Hepatojugular reflux. Thyroid not   Palpable  CHEST: clear to auscultation and percussion  CARDIOVASCULAR: S1, S2 prominent apical murmur seems similar to previous examination    Brachial, radial  pulses are intact and symmetric.   No carotid bruits noted.  ABDOMEN: Non tender. BS+. No bruits.  EXTREMITIES: No cyanosis, clubbing or edema.    Lab Results:  LIPIDS:  No results found for: CHOL  No results found for: HDL  No components found for: LDLCALC  No results found for: TRIG  No results found for: CHOLHDL    BMP:  Lab Results   Component Value Date    BUN 21 01/06/2022     01/06/2022    CO2 24 01/06/2022         This note has been dictated using voice recognition software. Any grammatical or context distortions are unintentional and inherent to the software.  Ben Gutierrez MD  [facility]  [phone]

## 2022-05-03 NOTE — PATIENT INSTRUCTIONS
Alvarado Quan    Thank you for coming to the [facility] Clinic today for a cardiac evaluation  It was my pleasure to see you today  A good contact for any questions would be Didi Reyes  RN @ [phone]     Continue to have frequent episodes of nocturia-urination at night this interferes with your sleep and you are pretty tired all day  He had comprehensive blood work on January 6, 2022 which was all excellent  An episode left-sided chest pain -this seemed to be more of a tenderness ; although might of been related to the heart  EKG today shows no changes  If you have more trouble with chest pain we may need to do further evaluation and if you have prolongs chest pain you should go to the emergency room for evaluation  Continue current medications  Follow-up in 6 months with a general cardiologist-I will have retired by then

## 2022-05-03 NOTE — Clinical Note
Continue to have frequent episodes of nocturia-urination at night this interferes with your sleep and you are pretty tired all day He had comprehensive blood work on January 6, 2022 which was all excellent An episode left-sided chest pain -this seemed to be more of a tenderness ; although might of been related to the heart EKG today shows no changes If you have more trouble with chest pain we may need to do further evaluation and if you have prolongs chest pain you should go to the emergency room for evaluation Continue current medications Follow-up in 6 months with a general cardiologist-I will have retired by then

## 2022-05-03 NOTE — LETTER
5/3/2022    Beth Cheema MD  [place] [address]  [place] [address]    RE: Alvarado ASAD Kadeem       Dear Colleague,     I had the pleasure of seeing Alvarado Quan in the [facility].  [facility] Note  Assessment:  Mitral valve repair for severe mitral regurgitation at [facility] June 22, 2017      Dr Velez 63 mm [employer] annulus       Plication of the medial scallop of the posterior leaflet  Echocardiogram November 6, 2017 showed mild to moderate mitral regurgitation; mild to moderate aortic insufficiency  Echocardiogram April 14, 2021 showed ejection fraction 58% with moderate mitral regurgitation      Prominent  Apical  systolic l murmur   Postoperative atrial fibrillation converted with IV amiodarone       Postoperative typical atrial flutter   Postoperative SVT converted with IV metoprolol with some sinus bradycardia (ECG HR=47)  Right bundle branch block with left axis deviation  Preserved left ventricular function with ejection fraction equals 59% by echocardiogram June 26, 2017    ECG shows sinus bradycardia at 55 bpm, right bundle branch block, RI prolongation but no acute changes      Plan:    Continue to have frequent episodes of nocturia-urination at night this interferes with your sleep and you are pretty tired all day  He had comprehensive blood work on January 6, 2022 which was all excellent  An episode left-sided chest pain -this seemed to be more of a tenderness ; although might of been related to the heart  EKG today shows no changes  If you have more trouble with chest pain we may need to do further evaluation and if you have prolongs chest pain you should go to the emergency room for evaluation  Continue current medications  Follow-up in 6 months with a general cardiologist-I will have retired by then        Subjective:    I had the opportunity to see.Alvarado W Kadeem , who is a 87 year old male with a known history of valve repair and  arrhythmias  Lots of trouble with urination has nocturia and awakens multiple times during the night with urination  Has been seen by interventional radiology and had some type of intervention done that should help strengthen the prostate not clear that this has helped  Had comprehensive blood work on January this showed comprehensive metabolic panel normal  Hemoglobin 10.3  Had episode of chest pain pretty intense localized might of been associated with tenderness otherwise has no angina or exertional pain no history of coronary artery disease  No awareness of palpitations or arrhythmias no syncopal or near syncopal episodes  Pretty tired sleeps most of the day does not sleep much at night because of his nocturia  Not very energetic or vigorous  Weight has been stable no longer takes supplements seem to have gained a little bit of weight when he was on supplements but has lost interest    Would like to go to [place] for an extended time; told him that he did not seem like he had enough endurance and stamina to make the trip and advised against it          Problem List:  Patient Active Problem List   Diagnosis     Status post mitral valve repair     Typical atrial flutter (H)     Anticoagulation goal of INR 2 to 3     Heart failure with preserved ejection fraction (H)     Benign essential hypertension     Iron deficiency anemia     Medical History:  Past Medical History:   Diagnosis Date     Arrhythmia     Atrial flutter     Atrial fibrillation (H)      Heart murmur     Mitral valve prolapse     Hypertension      Mitral regurgitation     severe     Right bundle branch block      Surgical History:  Past Surgical History:   Procedure Laterality Date     IR PAE  10/14/2021     MITRAL VALVE REPAIR  06/2017    [facility] at [facility]-plicated suture of posterior leaflet with annuloplasty band     Social History:  Social History     Socioeconomic History     Marital status:      Spouse name: Not on file     Number of  children: Not on file     Years of education: Not on file     Highest education level: Not on file   Occupational History     Not on file   Tobacco Use     Smoking status: Former Smoker     Smokeless tobacco: Never Used   Vaping Use     Vaping Use: Never used   Substance and Sexual Activity     Alcohol use: Yes     Comment: Alcoholic Drinks/day: occ     Drug use: No     Sexual activity: Not on file   Other Topics Concern     Parent/sibling w/ CABG, MI or angioplasty before 65F 55M? Not Asked   Social History Narrative     Not on file     Social Determinants of Health     Financial Resource Strain: Not on file   Food Insecurity: Not on file   Transportation Needs: Not on file   Physical Activity: Not on file   Stress: Not on file   Social Connections: Not on file   Intimate Partner Violence: Not on file   Housing Stability: Not on file       Review of Systems   ,         Family History:  Family History   Problem Relation Age of Onset     Hypertension Father      Cerebrovascular Disease Brother          Allergies:  No Known Allergies    Medications:  Current Outpatient Medications   Medication Sig Dispense Refill     cholecalciferol, vitamin D3, (VITAMIN D3) 50 mcg (2,000 unit) capsule Take 2,000 Units by mouth daily        cod liver oil cap Take 1 capsule by mouth daily        lisinopril (PRINIVIL,ZESTRIL) 10 MG tablet [LISINOPRIL (PRINIVIL,ZESTRIL) 10 MG TABLET] Take 10 mg by mouth daily.       triamcinolone (KENALOG) 0.1 % ointment [TRIAMCINOLONE (KENALOG) 0.1 % OINTMENT] APPLY TOPICALLY TO TO THE AFFECTED AREA ONE TO FOUR TIMES DAILY AS DIRECTED . DO NOT APPLY TO FACE OR CREASES       methylPREDNISolone (MEDROL DOSEPAK) 4 MG tablet therapy pack Follow Package Directions     only needed if having difficulty urinating 21 tablet 0       Objective:   Vital signs:  /68 (BP Location: Right arm, Patient Position: Sitting, Cuff Size: Adult Regular)   Pulse 60   Resp 16   Wt 58.1 kg (128 lb)   BMI 17.36 kg/m         Physical Exam:      GENERAL APPEARANCE: Alert, cooperative and in no acute distress.  HEENT: No scleral icterus. No Xanthelasma. Oral mucous membranes pink and moist.  NECK: JVP normal cm. No Hepatojugular reflux. Thyroid not  Palpable  CHEST: clear to auscultation and percussion  CARDIOVASCULAR: S1, S2 prominent apical murmur seems similar to previous examination    Brachial, radial  pulses are intact and symmetric.   No carotid bruits noted.  ABDOMEN: Non tender. BS+. No bruits.  EXTREMITIES: No cyanosis, clubbing or edema.    Lab Results:  LIPIDS:  No results found for: CHOL  No results found for: HDL  No components found for: LDLCALC  No results found for: TRIG  No results found for: CHOLHDL    BMP:  Lab Results   Component Value Date    BUN 21 01/06/2022     01/06/2022    CO2 24 01/06/2022         This note has been dictated using voice recognition software. Any grammatical or context distortions are unintentional and inherent to the software.  Ben Gutierrez MD  [facility]  [phone]      Thank you for allowing me to participate in the care of your patient.      Sincerely,     Ben Gutierrez MD, MD     [facility]  cc:   Referred Self,

## 2022-07-23 ENCOUNTER — HEALTH MAINTENANCE LETTER (OUTPATIENT)
Age: 87
End: 2022-07-23

## 2022-10-01 ENCOUNTER — HEALTH MAINTENANCE LETTER (OUTPATIENT)
Age: 87
End: 2022-10-01

## 2023-01-01 ENCOUNTER — LAB REQUISITION (OUTPATIENT)
Dept: LAB | Facility: CLINIC | Age: 88
End: 2023-01-01
Payer: MEDICARE

## 2023-01-01 DIAGNOSIS — D64.9 ANEMIA, UNSPECIFIED: ICD-10-CM

## 2023-01-01 LAB
ANION GAP SERPL CALCULATED.3IONS-SCNC: 11 MMOL/L (ref 7–15)
BUN SERPL-MCNC: 22.9 MG/DL (ref 8–23)
CALCIUM SERPL-MCNC: 10 MG/DL (ref 8.8–10.2)
CHLORIDE SERPL-SCNC: 105 MMOL/L (ref 98–107)
CREAT SERPL-MCNC: 1.03 MG/DL (ref 0.67–1.17)
DEPRECATED HCO3 PLAS-SCNC: 24 MMOL/L (ref 22–29)
EGFRCR SERPLBLD CKD-EPI 2021: 71 ML/MIN/1.73M2
GLUCOSE SERPL-MCNC: 78 MG/DL (ref 70–99)
POTASSIUM SERPL-SCNC: 4.6 MMOL/L (ref 3.4–5.3)
SODIUM SERPL-SCNC: 140 MMOL/L (ref 135–145)

## 2023-01-01 PROCEDURE — 80048 BASIC METABOLIC PNL TOTAL CA: CPT | Mod: ORL | Performed by: FAMILY MEDICINE

## 2023-01-18 ENCOUNTER — LAB REQUISITION (OUTPATIENT)
Dept: LAB | Facility: CLINIC | Age: 88
End: 2023-01-18
Payer: MEDICARE

## 2023-01-18 DIAGNOSIS — I10 ESSENTIAL (PRIMARY) HYPERTENSION: ICD-10-CM

## 2023-01-18 LAB
ALBUMIN SERPL BCG-MCNC: 4.3 G/DL (ref 3.5–5.2)
ALP SERPL-CCNC: 52 U/L (ref 40–129)
ALT SERPL W P-5'-P-CCNC: 26 U/L (ref 10–50)
ANION GAP SERPL CALCULATED.3IONS-SCNC: 14 MMOL/L (ref 7–15)
AST SERPL W P-5'-P-CCNC: 36 U/L (ref 10–50)
BILIRUB SERPL-MCNC: 0.5 MG/DL
BUN SERPL-MCNC: 32.8 MG/DL (ref 8–23)
CALCIUM SERPL-MCNC: 9.9 MG/DL (ref 8.8–10.2)
CHLORIDE SERPL-SCNC: 107 MMOL/L (ref 98–107)
CREAT SERPL-MCNC: 1.08 MG/DL (ref 0.67–1.17)
DEPRECATED HCO3 PLAS-SCNC: 22 MMOL/L (ref 22–29)
GFR SERPL CREATININE-BSD FRML MDRD: 67 ML/MIN/1.73M2
GLUCOSE SERPL-MCNC: 83 MG/DL (ref 70–99)
POTASSIUM SERPL-SCNC: 4.7 MMOL/L (ref 3.4–5.3)
PROT SERPL-MCNC: 6.5 G/DL (ref 6.4–8.3)
SODIUM SERPL-SCNC: 143 MMOL/L (ref 136–145)

## 2023-01-18 PROCEDURE — 80053 COMPREHEN METABOLIC PANEL: CPT | Mod: ORL | Performed by: FAMILY MEDICINE

## 2023-03-13 ENCOUNTER — LAB REQUISITION (OUTPATIENT)
Dept: LAB | Facility: CLINIC | Age: 88
End: 2023-03-13
Payer: MEDICARE

## 2023-03-13 DIAGNOSIS — I10 ESSENTIAL (PRIMARY) HYPERTENSION: ICD-10-CM

## 2023-03-13 LAB
ANION GAP SERPL CALCULATED.3IONS-SCNC: 11 MMOL/L (ref 7–15)
BUN SERPL-MCNC: 29.4 MG/DL (ref 8–23)
CALCIUM SERPL-MCNC: 10 MG/DL (ref 8.8–10.2)
CHLORIDE SERPL-SCNC: 105 MMOL/L (ref 98–107)
CREAT SERPL-MCNC: 1.08 MG/DL (ref 0.67–1.17)
DEPRECATED HCO3 PLAS-SCNC: 22 MMOL/L (ref 22–29)
GFR SERPL CREATININE-BSD FRML MDRD: 67 ML/MIN/1.73M2
GLUCOSE SERPL-MCNC: 72 MG/DL (ref 70–99)
POTASSIUM SERPL-SCNC: 4.5 MMOL/L (ref 3.4–5.3)
SODIUM SERPL-SCNC: 138 MMOL/L (ref 136–145)

## 2023-03-13 PROCEDURE — 80048 BASIC METABOLIC PNL TOTAL CA: CPT | Mod: ORL | Performed by: FAMILY MEDICINE

## 2023-08-28 ENCOUNTER — LAB REQUISITION (OUTPATIENT)
Dept: LAB | Facility: CLINIC | Age: 88
End: 2023-08-28
Payer: MEDICARE

## 2023-08-28 DIAGNOSIS — R82.998 OTHER ABNORMAL FINDINGS IN URINE: ICD-10-CM

## 2023-08-28 PROCEDURE — 87086 URINE CULTURE/COLONY COUNT: CPT | Mod: ORL | Performed by: FAMILY MEDICINE

## 2023-08-30 LAB — BACTERIA UR CULT: NORMAL

## 2024-01-01 ENCOUNTER — LAB REQUISITION (OUTPATIENT)
Dept: LAB | Facility: CLINIC | Age: 89
End: 2024-01-01
Payer: MEDICARE

## 2024-01-01 ENCOUNTER — HEALTH MAINTENANCE LETTER (OUTPATIENT)
Age: 89
End: 2024-01-01

## 2024-01-01 DIAGNOSIS — I10 ESSENTIAL (PRIMARY) HYPERTENSION: ICD-10-CM

## 2024-01-01 DIAGNOSIS — R29.6 REPEATED FALLS: ICD-10-CM

## 2024-01-01 DIAGNOSIS — D64.9 ANEMIA, UNSPECIFIED: ICD-10-CM

## 2024-01-01 DIAGNOSIS — N39.0 URINARY TRACT INFECTION, SITE NOT SPECIFIED: ICD-10-CM

## 2024-01-01 DIAGNOSIS — R53.1 WEAKNESS: ICD-10-CM

## 2024-01-01 LAB
ALBUMIN SERPL BCG-MCNC: 4.1 G/DL (ref 3.5–5.2)
ALBUMIN SERPL BCG-MCNC: 4.2 G/DL (ref 3.5–5.2)
ALBUMIN UR-MCNC: 10 MG/DL
ALBUMIN UR-MCNC: 20 MG/DL
ALP SERPL-CCNC: 119 U/L (ref 40–150)
ALP SERPL-CCNC: 87 U/L (ref 40–150)
ALT SERPL W P-5'-P-CCNC: 26 U/L (ref 0–70)
ALT SERPL W P-5'-P-CCNC: 26 U/L (ref 0–70)
AMORPH CRY #/AREA URNS HPF: ABNORMAL /HPF
ANION GAP SERPL CALCULATED.3IONS-SCNC: 10 MMOL/L (ref 7–15)
ANION GAP SERPL CALCULATED.3IONS-SCNC: 12 MMOL/L (ref 7–15)
ANION GAP SERPL CALCULATED.3IONS-SCNC: 12 MMOL/L (ref 7–15)
APPEARANCE UR: ABNORMAL
APPEARANCE UR: ABNORMAL
AST SERPL W P-5'-P-CCNC: 42 U/L (ref 0–45)
AST SERPL W P-5'-P-CCNC: 54 U/L (ref 0–45)
BACTERIA UR CULT: NO GROWTH
BACTERIA UR CULT: NORMAL
BASOPHILS # BLD AUTO: 0 10E3/UL (ref 0–0.2)
BASOPHILS NFR BLD AUTO: 1 %
BILIRUB SERPL-MCNC: 0.7 MG/DL
BILIRUB SERPL-MCNC: 0.7 MG/DL
BILIRUB UR QL STRIP: NEGATIVE
BILIRUB UR QL STRIP: NEGATIVE
BUN SERPL-MCNC: 24.1 MG/DL (ref 8–23)
BUN SERPL-MCNC: 26.8 MG/DL (ref 8–23)
BUN SERPL-MCNC: 28.7 MG/DL (ref 8–23)
CALCIUM SERPL-MCNC: 9.2 MG/DL (ref 8.8–10.4)
CALCIUM SERPL-MCNC: 9.5 MG/DL (ref 8.8–10.2)
CALCIUM SERPL-MCNC: 9.6 MG/DL (ref 8.8–10.2)
CHLORIDE SERPL-SCNC: 103 MMOL/L (ref 98–107)
CHLORIDE SERPL-SCNC: 105 MMOL/L (ref 98–107)
CHLORIDE SERPL-SCNC: 109 MMOL/L (ref 98–107)
COLOR UR AUTO: ABNORMAL
COLOR UR AUTO: YELLOW
CREAT SERPL-MCNC: 0.86 MG/DL (ref 0.67–1.17)
CREAT SERPL-MCNC: 0.94 MG/DL (ref 0.67–1.17)
CREAT SERPL-MCNC: 1.35 MG/DL (ref 0.67–1.17)
DEPRECATED HCO3 PLAS-SCNC: 23 MMOL/L (ref 22–29)
DEPRECATED HCO3 PLAS-SCNC: 25 MMOL/L (ref 22–29)
EGFRCR SERPLBLD CKD-EPI 2021: 51 ML/MIN/1.73M2
EGFRCR SERPLBLD CKD-EPI 2021: 78 ML/MIN/1.73M2
EGFRCR SERPLBLD CKD-EPI 2021: 84 ML/MIN/1.73M2
EOSINOPHIL # BLD AUTO: 0.1 10E3/UL (ref 0–0.7)
EOSINOPHIL NFR BLD AUTO: 1 %
ERYTHROCYTE [DISTWIDTH] IN BLOOD BY AUTOMATED COUNT: 13.4 % (ref 10–15)
GLUCOSE SERPL-MCNC: 121 MG/DL (ref 70–99)
GLUCOSE SERPL-MCNC: 90 MG/DL (ref 70–99)
GLUCOSE SERPL-MCNC: 92 MG/DL (ref 70–99)
GLUCOSE UR STRIP-MCNC: NEGATIVE MG/DL
GLUCOSE UR STRIP-MCNC: NEGATIVE MG/DL
HCO3 SERPL-SCNC: 25 MMOL/L (ref 22–29)
HCT VFR BLD AUTO: 30.1 % (ref 40–53)
HGB BLD-MCNC: 8.8 G/DL (ref 13.3–17.7)
HGB UR QL STRIP: ABNORMAL
HGB UR QL STRIP: ABNORMAL
IMM GRANULOCYTES # BLD: 0 10E3/UL
IMM GRANULOCYTES NFR BLD: 1 %
KETONES UR STRIP-MCNC: NEGATIVE MG/DL
KETONES UR STRIP-MCNC: NEGATIVE MG/DL
LEUKOCYTE ESTERASE UR QL STRIP: ABNORMAL
LEUKOCYTE ESTERASE UR QL STRIP: ABNORMAL
LYMPHOCYTES # BLD AUTO: 0.5 10E3/UL (ref 0.8–5.3)
LYMPHOCYTES NFR BLD AUTO: 7 %
MCH RBC QN AUTO: 30.2 PG (ref 26.5–33)
MCHC RBC AUTO-ENTMCNC: 29.2 G/DL (ref 31.5–36.5)
MCV RBC AUTO: 103 FL (ref 78–100)
MONOCYTES # BLD AUTO: 0.8 10E3/UL (ref 0–1.3)
MONOCYTES NFR BLD AUTO: 12 %
MUCOUS THREADS #/AREA URNS LPF: PRESENT /LPF
MUCOUS THREADS #/AREA URNS LPF: PRESENT /LPF
NEUTROPHILS # BLD AUTO: 5.2 10E3/UL (ref 1.6–8.3)
NEUTROPHILS NFR BLD AUTO: 78 %
NITRATE UR QL: NEGATIVE
NITRATE UR QL: NEGATIVE
NRBC # BLD AUTO: 0 10E3/UL
NRBC BLD AUTO-RTO: 0 /100
PH UR STRIP: 5 [PH] (ref 5–7)
PH UR STRIP: 5.5 [PH] (ref 5–7)
PLATELET # BLD AUTO: 208 10E3/UL (ref 150–450)
POTASSIUM SERPL-SCNC: 4.4 MMOL/L (ref 3.4–5.3)
POTASSIUM SERPL-SCNC: 4.5 MMOL/L (ref 3.4–5.3)
POTASSIUM SERPL-SCNC: 4.5 MMOL/L (ref 3.4–5.3)
PROT SERPL-MCNC: 7.1 G/DL (ref 6.4–8.3)
PROT SERPL-MCNC: 7.1 G/DL (ref 6.4–8.3)
RBC # BLD AUTO: 2.91 10E6/UL (ref 4.4–5.9)
RBC URINE: 26 /HPF
RBC URINE: 4 /HPF
SODIUM SERPL-SCNC: 138 MMOL/L (ref 135–145)
SODIUM SERPL-SCNC: 140 MMOL/L (ref 135–145)
SODIUM SERPL-SCNC: 146 MMOL/L (ref 135–145)
SP GR UR STRIP: 1.02 (ref 1–1.03)
SP GR UR STRIP: 1.02 (ref 1–1.03)
SQUAMOUS EPITHELIAL: <1 /HPF
UROBILINOGEN UR STRIP-MCNC: NORMAL MG/DL
UROBILINOGEN UR STRIP-MCNC: NORMAL MG/DL
WBC # BLD AUTO: 6.6 10E3/UL (ref 4–11)
WBC URINE: 10 /HPF
WBC URINE: 10 /HPF

## 2024-01-01 PROCEDURE — 36415 COLL VENOUS BLD VENIPUNCTURE: CPT | Mod: ORL | Performed by: NURSE PRACTITIONER

## 2024-01-01 PROCEDURE — 80053 COMPREHEN METABOLIC PANEL: CPT | Mod: ORL | Performed by: FAMILY MEDICINE

## 2024-01-01 PROCEDURE — 80048 BASIC METABOLIC PNL TOTAL CA: CPT | Mod: ORL | Performed by: NURSE PRACTITIONER

## 2024-01-01 PROCEDURE — 87086 URINE CULTURE/COLONY COUNT: CPT | Mod: ORL | Performed by: FAMILY MEDICINE

## 2024-01-01 PROCEDURE — P9604 ONE-WAY ALLOW PRORATED TRIP: HCPCS | Mod: ORL | Performed by: NURSE PRACTITIONER

## 2024-01-01 PROCEDURE — 81001 URINALYSIS AUTO W/SCOPE: CPT | Mod: ORL | Performed by: FAMILY MEDICINE

## 2024-01-01 PROCEDURE — 85025 COMPLETE CBC W/AUTO DIFF WBC: CPT | Mod: ORL | Performed by: NURSE PRACTITIONER

## (undated) RX ORDER — NITROGLYCERIN 5 MG/ML
VIAL (ML) INTRAVENOUS
Status: DISPENSED
Start: 2021-10-14

## (undated) RX ORDER — SODIUM CHLORIDE 9 MG/ML
INJECTION, SOLUTION INTRAVENOUS
Status: DISPENSED
Start: 2021-10-14

## (undated) RX ORDER — LIDOCAINE HYDROCHLORIDE 10 MG/ML
INJECTION, SOLUTION EPIDURAL; INFILTRATION; INTRACAUDAL; PERINEURAL
Status: DISPENSED
Start: 2021-10-14

## (undated) RX ORDER — CEFAZOLIN SODIUM 2 G/100ML
INJECTION, SOLUTION INTRAVENOUS
Status: DISPENSED
Start: 2021-10-14

## (undated) RX ORDER — FENTANYL CITRATE 50 UG/ML
INJECTION, SOLUTION INTRAMUSCULAR; INTRAVENOUS
Status: DISPENSED
Start: 2021-10-14

## (undated) RX ORDER — NITROGLYCERIN 0.4 MG/1
TABLET SUBLINGUAL
Status: DISPENSED
Start: 2021-09-13